# Patient Record
Sex: FEMALE | Race: WHITE | Employment: FULL TIME | URBAN - METROPOLITAN AREA
[De-identification: names, ages, dates, MRNs, and addresses within clinical notes are randomized per-mention and may not be internally consistent; named-entity substitution may affect disease eponyms.]

---

## 2017-07-07 ENCOUNTER — OFFICE VISIT (OUTPATIENT)
Dept: FAMILY MEDICINE CLINIC | Facility: CLINIC | Age: 27
End: 2017-07-07

## 2017-07-07 VITALS
RESPIRATION RATE: 14 BRPM | TEMPERATURE: 98 F | DIASTOLIC BLOOD PRESSURE: 80 MMHG | SYSTOLIC BLOOD PRESSURE: 102 MMHG | HEART RATE: 88 BPM

## 2017-07-07 DIAGNOSIS — H61.22 IMPACTED CERUMEN OF LEFT EAR: ICD-10-CM

## 2017-07-07 DIAGNOSIS — H65.02 ACUTE SEROUS OTITIS MEDIA OF LEFT EAR, RECURRENCE NOT SPECIFIED: Primary | ICD-10-CM

## 2017-07-07 PROCEDURE — 69209 REMOVE IMPACTED EAR WAX UNI: CPT | Performed by: NURSE PRACTITIONER

## 2017-07-07 PROCEDURE — 99202 OFFICE O/P NEW SF 15 MIN: CPT | Performed by: NURSE PRACTITIONER

## 2017-07-07 RX ORDER — AMOXICILLIN 875 MG/1
875 TABLET, COATED ORAL 2 TIMES DAILY
Qty: 20 TABLET | Refills: 0 | Status: SHIPPED | OUTPATIENT
Start: 2017-07-07 | End: 2017-07-17

## 2017-07-07 NOTE — PROGRESS NOTES
CHIEF COMPLAINT:   Patient presents with:  Ear Pain      HPI:   Minerva Shannon is a 28year old female who presents to clinic today with complaints of left ear pain. Has had for 10  days. Pain is described as really bad pain and pressure.   Patient repor NECK: supple, non-tender  LUNGS: clear to auscultation bilaterally, no wheezes or rhonchi. Breathing is non labored. CARDIO: RRR without murmur  EXTREMITIES: no cyanosis, clubbing or edema  LYMPH: no cervical lymphadenopathy.       ASSESSMENT AND PLAN:   L · You may use over-the-counter medicine, such as acetaminophen or ibuprofen, to control pain and fever, unless something else was prescribed.  If you have chronic liver or kidney disease or have ever had a stomach ulcer or gastrointestinal bleeding, talk wi Tiny glands in your ear make substances that combine with dead skin cells to form earwax. Earwax helps protect your ear canal from water, dirt, infection, and injury.  Over time, earwax travels from the inner part of your ear canal to the entrance of the ca · Ear drops. These help to soften the earwax. This helps it leave the ear over time. · Rinsing (irrigation) of the ear canal with water. This is done in a doctor’s office. · Removal of the earwax with small tools. This is also done in a doctor’s office. If you have a tendency to build up wax in the ear canal, you should clear the wax at home regularly, before it causes discomfort. This should be about once every six months.   · Unless a medicine was prescribed, you may use an over-the-counter product made · Swelling, redness, or tenderness of the outer ear  · Headache, neck pain, or stiff neck  Date Last Reviewed: 3/22/2015  © 6884-5140 The [address] [place] [address], [address] [place] [address]. All rights reserved.  This information is not intende

## 2017-07-07 NOTE — PATIENT INSTRUCTIONS
Middle Ear Infection (Adult)  You have an infection of the middle ear, the space behind the eardrum. This is also called acute otitis media (AOM). Sometimes it is caused by the common cold.  This is because congestion can block the internal passage (eusta Impacted earwax is a buildup of the natural wax in the ear (cerumen). Impacted earwax is very common. It can cause symptoms such as hearing loss. It can also stop a doctor doing an exam of your ear.   Understanding earwax  Tiny glands in your ear make subst Treatment for impacted earwax  If you don’t have symptoms, you may not need treatment. Often the earwax goes away on its own with time. If you have symptoms, you may have 1 or more treatments such as:  · Ear drops. These help to soften the earwax.  This hel Everyone produces earwax from the lining of the ear canal. It lubricates and protects the ear. The wax that forms in the canal slowly moves toward the outside of the ear and falls out.  Sometimes wax can build up in the ear canal. This can cause a blockage Follow up with your healthcare provider, or as advised.   When to seek medical advice  Call your healthcare provider right away if any of these occur:  · Worsening ear pain  · Fever of 100.4°F (38°C) or higher, or as directed by your healthcare provider  ·

## 2017-11-15 ENCOUNTER — OFFICE VISIT (OUTPATIENT)
Dept: INTERNAL MEDICINE CLINIC | Facility: CLINIC | Age: 27
End: 2017-11-15

## 2017-11-15 VITALS
SYSTOLIC BLOOD PRESSURE: 104 MMHG | BODY MASS INDEX: 18.33 KG/M2 | TEMPERATURE: 98 F | DIASTOLIC BLOOD PRESSURE: 60 MMHG | HEART RATE: 70 BPM | RESPIRATION RATE: 16 BRPM | HEIGHT: 65 IN | WEIGHT: 110 LBS

## 2017-11-15 DIAGNOSIS — T78.1XXA GASTROINTESTINAL FOOD SENSITIVITY: ICD-10-CM

## 2017-11-15 DIAGNOSIS — L03.011 PARONYCHIA OF FINGER OF RIGHT HAND: ICD-10-CM

## 2017-11-15 DIAGNOSIS — R12 HEARTBURN: Primary | ICD-10-CM

## 2017-11-15 PROCEDURE — 99203 OFFICE O/P NEW LOW 30 MIN: CPT | Performed by: FAMILY MEDICINE

## 2017-11-15 RX ORDER — SUCRALFATE 1 G/1
1 TABLET ORAL
Qty: 120 TABLET | Refills: 1 | Status: SHIPPED | OUTPATIENT
Start: 2017-11-15 | End: 2017-11-15

## 2017-11-15 RX ORDER — CEPHALEXIN 500 MG/1
500 CAPSULE ORAL 2 TIMES DAILY
Qty: 14 CAPSULE | Refills: 0 | Status: SHIPPED | OUTPATIENT
Start: 2017-11-15 | End: 2017-11-22

## 2017-11-15 RX ORDER — ACETAMINOPHEN AND CODEINE PHOSPHATE 300; 30 MG/1; MG/1
1 TABLET ORAL EVERY 6 HOURS PRN
Qty: 12 TABLET | Refills: 0 | Status: SHIPPED | OUTPATIENT
Start: 2017-11-15 | End: 2018-02-12

## 2017-11-15 RX ORDER — SUCRALFATE 1 G/1
1 TABLET ORAL 2 TIMES DAILY PRN
Qty: 180 TABLET | Refills: 1 | Status: SHIPPED | OUTPATIENT
Start: 2017-11-15 | End: 2017-11-16

## 2017-11-15 RX ORDER — SULFAMETHOXAZOLE AND TRIMETHOPRIM 800; 160 MG/1; MG/1
1 TABLET ORAL 2 TIMES DAILY
Qty: 7 TABLET | Refills: 0 | Status: SHIPPED | OUTPATIENT
Start: 2017-11-15 | End: 2017-11-16

## 2017-11-15 RX ORDER — ETONOGESTREL/ETHINYL ESTRADIOL .12-.015MG
RING, VAGINAL VAGINAL
Refills: 0 | COMMUNITY
Start: 2017-11-11 | End: 2018-05-16

## 2017-11-16 RX ORDER — SULFAMETHOXAZOLE AND TRIMETHOPRIM 800; 160 MG/1; MG/1
1 TABLET ORAL 2 TIMES DAILY
Qty: 14 TABLET | Refills: 0 | Status: SHIPPED | OUTPATIENT
Start: 2017-11-16 | End: 2018-02-12

## 2017-11-16 RX ORDER — SUCRALFATE 1 G/1
1 TABLET ORAL 2 TIMES DAILY PRN
Qty: 180 TABLET | Refills: 1 | Status: SHIPPED | OUTPATIENT
Start: 2017-11-16 | End: 2019-03-29

## 2017-11-16 NOTE — TELEPHONE ENCOUNTER
Received fax from [employer] for 2 meds Sucralfate and Sulfamethoxazole. Please refer to fax for questions regarding qty and sig for both meds.

## 2017-11-18 NOTE — PATIENT INSTRUCTIONS
Paronychia of the Finger or Toe  Paronychia is an infection near a fingernail or toenail. It usually occurs when an opening in the cuticle or an ingrown toenail lets bacteria under the skin. The infection will need to be drained if pus is present.  If th · Fever of 100.4ºF (38ºC) or higher, or as directed by your provider  Date Last Reviewed: 8/1/2016  © 0339-9284 The [employer]. [address] [facility], [address] [place] [address]. All rights reserved.  This information is not intended as a substitute for

## 2017-11-18 NOTE — PROGRESS NOTES
HPI:    Patient ID: Rock Sheppard is a 28year old female. HPI Here to establish care. Patient has had pain in right 3rd finger and swelling starting about one week ago. Swelling worsened despite patient soaking in warm water.  Patient c/o increasing p Acetaminophen-Codeine #3 300-30 MG Oral Tab Take 1 tablet by mouth every 6 (six) hours as needed for Pain. Disp: 12 tablet Rfl: 0   Sulfamethoxazole-TMP -160 MG Oral Tab per tablet Take 1 tablet by mouth 2 (two) times daily.  Disp: 14 tablet Rfl: 0 ALLERGY - INTERNAL       WD#[id number]

## 2018-02-13 NOTE — PATIENT INSTRUCTIONS
Impacted Earwax     Inner ear structures including ear canal and eardrum. Impacted earwax is a buildup of the natural wax in the ear (cerumen). Impacted earwax is very common. It can cause symptoms such as hearing loss.  It can also make it difficult Excess earwax usually does not cause any symptoms, unless there is a large amount of buildup.  Then it may cause symptoms such as:  · Hearing loss  · Earache  · Sense of ear fullness  · Itching in the ear  · Odor from the ear  · Ear drainage  · Dizziness  · © 7730-6057 The [employer]. [facility] [facility], [facility] [place]. All rights reserved. This information is not intended as a substitute for professional medical care. Always follow your healthcare professional's instructions.

## 2018-02-13 NOTE — PROGRESS NOTES
HPI:    Patient ID: Kiersten Cain is a 28year old female. HPI Here with c/o anxiety she has dealt with for years. Has tried many things for this including exercise, meditation, talking with therapist and nothing helps lately.  Is frustrated by this an patient restart Debrox and can return if needed for re-flush. No orders of the defined types were placed in this encounter.       Meds This Visit:  Signed Prescriptions Disp Refills    escitalopram 10 MG Oral Tab 30 tablet 0      Si/2 tab PO daily x

## 2018-03-12 RX ORDER — ESCITALOPRAM OXALATE 10 MG/1
10 TABLET ORAL DAILY
Qty: 30 TABLET | Refills: 0 | Status: SHIPPED | OUTPATIENT
Start: 2018-03-12 | End: 2018-08-28

## 2018-03-12 NOTE — TELEPHONE ENCOUNTER
LFV:2/12/2018 with AMS  Future Appt: none on file  Last Rx:2/12/2018 for 30 tablets  Last Labs:none   Per protocol to provider

## 2018-04-05 NOTE — PROGRESS NOTES
HPI:    Patient ID: Taye Leahy is a 33year old female. HPI Here for f/u on Lexapro start. Patient has been taking for about 5 weeks and feels that this has helped her anxiety somewhat.  Notes since starting however, has had intermittent menstrual s were placed in this encounter. Meds This Visit:  Signed Prescriptions Disp Refills    escitalopram 20 MG Oral Tab 30 tablet 2      Sig: Take 1 tablet (20 mg total) by mouth daily.            Imaging & Referrals:  None       #[id number]

## 2018-05-16 ENCOUNTER — OFFICE VISIT (OUTPATIENT)
Dept: INTERNAL MEDICINE CLINIC | Facility: CLINIC | Age: 28
End: 2018-05-16

## 2018-05-16 VITALS
TEMPERATURE: 98 F | BODY MASS INDEX: 17.77 KG/M2 | RESPIRATION RATE: 15 BRPM | HEIGHT: 65 IN | WEIGHT: 106.63 LBS | HEART RATE: 83 BPM | SYSTOLIC BLOOD PRESSURE: 110 MMHG | DIASTOLIC BLOOD PRESSURE: 60 MMHG

## 2018-05-16 DIAGNOSIS — Z00.00 ANNUAL PHYSICAL EXAM: Primary | ICD-10-CM

## 2018-05-16 DIAGNOSIS — H91.92 HEARING LOSS OF LEFT EAR, UNSPECIFIED HEARING LOSS TYPE: ICD-10-CM

## 2018-05-16 DIAGNOSIS — Z12.4 SCREENING FOR CERVICAL CANCER: ICD-10-CM

## 2018-05-16 DIAGNOSIS — H92.02 LEFT EAR PAIN: ICD-10-CM

## 2018-05-16 DIAGNOSIS — F41.1 GAD (GENERALIZED ANXIETY DISORDER): ICD-10-CM

## 2018-05-16 PROCEDURE — 90471 IMMUNIZATION ADMIN: CPT | Performed by: FAMILY MEDICINE

## 2018-05-16 PROCEDURE — 88175 CYTOPATH C/V AUTO FLUID REDO: CPT | Performed by: FAMILY MEDICINE

## 2018-05-16 PROCEDURE — 99213 OFFICE O/P EST LOW 20 MIN: CPT | Performed by: FAMILY MEDICINE

## 2018-05-16 PROCEDURE — 90715 TDAP VACCINE 7 YRS/> IM: CPT | Performed by: FAMILY MEDICINE

## 2018-05-16 PROCEDURE — 99395 PREV VISIT EST AGE 18-39: CPT | Performed by: FAMILY MEDICINE

## 2018-05-16 RX ORDER — ETONOGESTREL/ETHINYL ESTRADIOL .12-.015MG
RING, VAGINAL VAGINAL
Qty: 3 EACH | Refills: 3 | Status: SHIPPED | OUTPATIENT
Start: 2018-05-16 | End: 2019-05-22 | Stop reason: ALTCHOICE

## 2018-05-18 NOTE — PROGRESS NOTES
HPI:    Patient ID: Janel Salas is a 33year old female. HPI Here for annual check-up. Patient is eating healthy and exercising regularly. Unsure of last tetanus booster but thinks it has been more than 10 years. Due for pap smear.  Pap smears have b frequency. Musculoskeletal: Negative for arthralgias and joint swelling. Skin: Negative for rash. Neurological: Negative for dizziness, weakness, numbness and headaches. Hematological: Negative for adenopathy. Does not bruise/bleed easily.    Psychi Neurological: She is alert and oriented to person, place, and time. Skin: Skin is warm and dry. Psychiatric: She has a normal mood and affect. Her behavior is normal.   Vitals reviewed.              ASSESSMENT/PLAN:   Annual physical exam  (primary en

## 2018-08-13 ENCOUNTER — TELEPHONE (OUTPATIENT)
Dept: INTERNAL MEDICINE CLINIC | Facility: CLINIC | Age: 28
End: 2018-08-13

## 2018-08-13 NOTE — TELEPHONE ENCOUNTER
Pt called wanting appt for ear pain/discuss changing dose of anxiety meds-offered her 8/14 at 1:15 ok per AMS and pt stated she works until 4-said she discussed going to therapist before w/AMS and would maybe think about doing that and would call us back i

## 2018-08-28 ENCOUNTER — TELEPHONE (OUTPATIENT)
Dept: INTERNAL MEDICINE CLINIC | Facility: CLINIC | Age: 28
End: 2018-08-28

## 2018-08-28 NOTE — TELEPHONE ENCOUNTER
Per AMS, pt needs sooner ENT appt d/t L ear pain/intermittent hearing loss. Spoke with David Michaud and scheduled OV on 9/4 at 11:40am with Dr. Julia Delgado in [facility]. Pt agreed to date/time and verbalized understanding.   David Michaud asked us to also cancel pt's OV on

## 2018-08-28 NOTE — PROGRESS NOTES
HPI:    Patient ID: Minerva Shannon is a 33year old female. HPI Here for f/u on Sertraline start. Patient took for about one month. Didn't like how it made her feel- felt nauseated.  Didn't feel like it helped for anxiety or depression so stopped this m weeks, sooner if issues with med. [facility] Navigator order placed again to try different Psychologist.   2. Made appt for patient for ENT 9/4. No orders of the defined types were placed in this encounter.       Meds This Visit:  Signed Prescriptions Disp Refill

## 2018-09-28 NOTE — TELEPHONE ENCOUNTER
Please call patient. We received a refill request for Desvenlafaxine 25mg- she was suppose to f/u in office to discuss this med since it was a new start. Does she want to make appt or does she like this med at this dose?  I can refill it without seeing her

## 2018-09-28 NOTE — TELEPHONE ENCOUNTER
E request  Medication(s) to Refill:   Requested Prescriptions     Pending Prescriptions Disp Refills   • DESVENLAFAXINE SUCCINATE ER 25 MG Oral Tablet 24 Hr [Pharmacy Med Name: DESVENLAFAXINE ER SUCCINATE 25MG T] 49 tablet 0     Sig: TAKE 1 TABLET BY MOUTH

## 2018-10-01 NOTE — TELEPHONE ENCOUNTER
Called pt, LMTCB to confirm if she wants to continue this medication or not - or if she wants to schedule OV for further discussion.

## 2018-10-02 NOTE — TELEPHONE ENCOUNTER
Pt called back and stated that this medication is working for her and she would like to stay on it so we can go ahead and refill    Please advise

## 2018-10-03 RX ORDER — DESVENLAFAXINE 25 MG/1
50 TABLET, EXTENDED RELEASE ORAL DAILY
Qty: 180 TABLET | Refills: 1 | Status: SHIPPED | OUTPATIENT
Start: 2018-10-03 | End: 2019-05-22 | Stop reason: ALTCHOICE

## 2018-10-26 ENCOUNTER — HOSPITAL ENCOUNTER (OUTPATIENT)
Dept: CT IMAGING | Facility: HOSPITAL | Age: 28
Discharge: HOME | End: 2018-10-26
Attending: OTOLARYNGOLOGY
Payer: COMMERCIAL

## 2018-10-26 DIAGNOSIS — H91.90 HEARING PROBLEM, UNSPECIFIED LATERALITY: ICD-10-CM

## 2018-10-26 PROCEDURE — 70480 CT ORBIT/EAR/FOSSA W/O DYE: CPT | Performed by: OTOLARYNGOLOGY

## 2019-03-13 RX ORDER — SUCRALFATE 1 G/1
TABLET ORAL
Qty: 180 TABLET | Refills: 0 | Status: SHIPPED | OUTPATIENT
Start: 2019-03-13 | End: 2021-06-18

## 2019-03-29 ENCOUNTER — OFFICE VISIT (OUTPATIENT)
Dept: INTERNAL MEDICINE CLINIC | Facility: CLINIC | Age: 29
End: 2019-03-29

## 2019-03-29 VITALS
RESPIRATION RATE: 20 BRPM | HEIGHT: 65 IN | BODY MASS INDEX: 17.33 KG/M2 | TEMPERATURE: 98 F | SYSTOLIC BLOOD PRESSURE: 98 MMHG | DIASTOLIC BLOOD PRESSURE: 60 MMHG | WEIGHT: 104 LBS | HEART RATE: 106 BPM

## 2019-03-29 DIAGNOSIS — F32.A ANXIETY AND DEPRESSION: ICD-10-CM

## 2019-03-29 DIAGNOSIS — R21 RASH: ICD-10-CM

## 2019-03-29 DIAGNOSIS — F41.9 ANXIETY AND DEPRESSION: ICD-10-CM

## 2019-03-29 DIAGNOSIS — Z30.09 ENCOUNTER FOR COUNSELING REGARDING CONTRACEPTION: Primary | ICD-10-CM

## 2019-03-29 PROCEDURE — 99214 OFFICE O/P EST MOD 30 MIN: CPT | Performed by: FAMILY MEDICINE

## 2019-03-29 RX ORDER — ERYTHROMYCIN 20 MG/G
1 GEL TOPICAL DAILY
Qty: 1 TUBE | Refills: 0 | Status: SHIPPED | OUTPATIENT
Start: 2019-03-29 | End: 2019-12-02

## 2019-03-29 RX ORDER — ESCITALOPRAM OXALATE 10 MG/1
10 TABLET ORAL DAILY
Qty: 90 TABLET | Refills: 0 | Status: SHIPPED | OUTPATIENT
Start: 2019-03-29 | End: 2019-12-02

## 2019-03-31 NOTE — PROGRESS NOTES
HPI:    Patient ID: Janel Salas is a 26year old female. HPI Here with rash that started on her face about 2 weeks ago. Comes and goes. Thought this was a reaction to her eating a lot of yogurt. No new products. Has had increased anxiety.  Taking P risks/benefits/potential side effects and proper use of medication. 3. Reviewed previous meds patient has been on and will try Lexapro again. Discussed risks/benefits/potential side effects and proper use of medication.      No orders of the defined types

## 2019-05-22 ENCOUNTER — OFFICE VISIT (OUTPATIENT)
Dept: OBGYN CLINIC | Facility: CLINIC | Age: 29
End: 2019-05-22

## 2019-05-22 VITALS
BODY MASS INDEX: 17.13 KG/M2 | SYSTOLIC BLOOD PRESSURE: 92 MMHG | HEIGHT: 65 IN | DIASTOLIC BLOOD PRESSURE: 60 MMHG | WEIGHT: 102.81 LBS

## 2019-05-22 DIAGNOSIS — Z01.419 WELL WOMAN EXAM WITH ROUTINE GYNECOLOGICAL EXAM: Primary | ICD-10-CM

## 2019-05-22 DIAGNOSIS — Z30.09 ENCOUNTER FOR COUNSELING REGARDING CONTRACEPTION: ICD-10-CM

## 2019-05-22 PROCEDURE — 99385 PREV VISIT NEW AGE 18-39: CPT | Performed by: OBSTETRICS & GYNECOLOGY

## 2019-05-22 NOTE — PATIENT INSTRUCTIONS
Please return in one year for your annual gyne visit or sooner if having abnormal vaginal bleeding or severe pelvic pain    ------------------------------------------------  If you desire the intrauterine device:     Please make an office visit for intraut

## 2019-05-22 NOTE — PROGRESS NOTES
[facility]  Obstetrics and Gynecology  History & Physical    CC: Patient is a new patient and here for a well woman exam     Subjective:     HPI: Maranda Daly is a 26year old [place] female here for a well women exam. Patient reports she woul name: Not on file      Number of children: Not on file      Years of education: Not on file      Highest education level: Not on file    Occupational History      Not on file    Social Needs      Financial resource strain: Not on file      Food insecurity: skin changes, pain, lumps or discharge   Respiratory:  denies SOB, dyspnea, cough or wheezing  Cardiovascular:  denies chest pain, palpitations  GI: denies abdominal pain, diarrhea, constipation  :  denies dysuria, hematuria, increased urinary frequency. irregular bleeding, infection, injury and pregnancy   - advised patient to consider options, information provided  - advised to contact office if she desires IUD placement  - d/w patient optimal placement plan including cytotec therapy, placement during me

## 2019-06-03 RX ORDER — ETONOGESTREL/ETHINYL ESTRADIOL .12-.015MG
RING, VAGINAL VAGINAL
Qty: 3 EACH | Refills: 2 | Status: SHIPPED | OUTPATIENT
Start: 2019-06-03 | End: 2019-12-02

## 2019-06-03 NOTE — TELEPHONE ENCOUNTER
LOV: 3/29/19  Future Visit: None  Last Rx: 5/16/18 3 ea 3 refills  Last Labs: pap 5/16/18  Per protocol per provider

## 2019-12-02 ENCOUNTER — OFFICE VISIT (OUTPATIENT)
Dept: OBGYN CLINIC | Facility: CLINIC | Age: 29
End: 2019-12-02
Payer: COMMERCIAL

## 2019-12-02 VITALS
WEIGHT: 103 LBS | BODY MASS INDEX: 17.16 KG/M2 | SYSTOLIC BLOOD PRESSURE: 100 MMHG | DIASTOLIC BLOOD PRESSURE: 60 MMHG | HEIGHT: 65 IN

## 2019-12-02 DIAGNOSIS — Z30.430 ENCOUNTER FOR INSERTION OF INTRAUTERINE CONTRACEPTIVE DEVICE: ICD-10-CM

## 2019-12-02 DIAGNOSIS — Z01.812 PRE-PROCEDURAL LABORATORY EXAMINATION: Primary | ICD-10-CM

## 2019-12-02 PROCEDURE — 58300 INSERT INTRAUTERINE DEVICE: CPT | Performed by: OBSTETRICS & GYNECOLOGY

## 2019-12-02 PROCEDURE — 81025 URINE PREGNANCY TEST: CPT | Performed by: OBSTETRICS & GYNECOLOGY

## 2019-12-02 NOTE — PROCEDURES
Procedure: Intrauterine device insertion - Mirena    Date of Procedure: 12/02/19    Pre-procedure diagnosis:  Encounter for contraception     Post-procedure diagnosis:   Encounter for contraction     Indications:   26year old female [place] who presents f Jason Coughlin MD   [facility] - OBGYN

## 2020-01-06 ENCOUNTER — OFFICE VISIT (OUTPATIENT)
Dept: OBGYN CLINIC | Facility: CLINIC | Age: 30
End: 2020-01-06
Payer: COMMERCIAL

## 2020-01-06 VITALS
SYSTOLIC BLOOD PRESSURE: 112 MMHG | DIASTOLIC BLOOD PRESSURE: 68 MMHG | BODY MASS INDEX: 17.33 KG/M2 | WEIGHT: 104 LBS | HEART RATE: 85 BPM | HEIGHT: 65 IN

## 2020-01-06 DIAGNOSIS — Z97.5 IUD (INTRAUTERINE DEVICE) IN PLACE: Primary | ICD-10-CM

## 2020-01-06 PROCEDURE — 99213 OFFICE O/P EST LOW 20 MIN: CPT | Performed by: OBSTETRICS & GYNECOLOGY

## 2020-01-06 NOTE — PROGRESS NOTES
[facility]  Obstetrics and Gynecology  Follow Up Progress Note    Subjective:     Greg Workman is a 26[place] who was last seen in office on 12/02/19 for Mirena IUD insertion.  The patient was recommended to return for follow u findings and plan were discussed with the patient. She notes understanding and agrees with the plan of care. All questions were answered to the best of my ability at this time.     RTC in 1 year or sooner if needed     Pao Roland MD   [facility] - OBGYN

## 2021-06-18 DIAGNOSIS — Z13.1 SCREENING FOR DIABETES MELLITUS: ICD-10-CM

## 2021-06-18 DIAGNOSIS — Z13.0 SCREENING FOR DEFICIENCY ANEMIA: Primary | ICD-10-CM

## 2021-06-18 DIAGNOSIS — Z13.220 SCREENING, LIPID: ICD-10-CM

## 2021-06-18 NOTE — TELEPHONE ENCOUNTER
Last OV 3.29.19 w/ AMS (rash)   Last PE No recent PE within last 2 years  Last REFILL 3.13.19 Sucralfate 1g #180 0R  Last LABS No recent labs within last 12 months     No future appointments. Per PROTOCOL?  FAILED-no appt in last 6 months or next 3 mon

## 2021-06-19 RX ORDER — SUCRALFATE 1 G/1
1 TABLET ORAL 2 TIMES DAILY PRN
Qty: 60 TABLET | Refills: 0 | Status: SHIPPED | OUTPATIENT
Start: 2021-06-19 | End: 2021-07-07

## 2021-06-19 NOTE — TELEPHONE ENCOUNTER
Please call patient to schedule annual check-up. She has not been seen for 1.5 years. Fasting lab orders are at THE [facility].

## 2021-06-21 NOTE — TELEPHONE ENCOUNTER
appt scheduled  Future Appointments   Date Time Provider Deanne Bernardo   7/7/2021  5:00 PM Gasper Hodges DO [facility]

## 2021-06-26 ENCOUNTER — LAB ENCOUNTER (OUTPATIENT)
Dept: LAB | Age: 31
End: 2021-06-26
Attending: FAMILY MEDICINE
Payer: COMMERCIAL

## 2021-06-26 DIAGNOSIS — Z13.1 SCREENING FOR DIABETES MELLITUS: ICD-10-CM

## 2021-06-26 DIAGNOSIS — Z13.0 SCREENING FOR DEFICIENCY ANEMIA: ICD-10-CM

## 2021-06-26 DIAGNOSIS — Z13.220 SCREENING, LIPID: ICD-10-CM

## 2021-06-26 PROCEDURE — 36415 COLL VENOUS BLD VENIPUNCTURE: CPT

## 2021-06-26 PROCEDURE — 85025 COMPLETE CBC W/AUTO DIFF WBC: CPT

## 2021-06-26 PROCEDURE — 80053 COMPREHEN METABOLIC PANEL: CPT

## 2021-06-26 PROCEDURE — 80061 LIPID PANEL: CPT

## 2021-07-07 ENCOUNTER — OFFICE VISIT (OUTPATIENT)
Dept: INTERNAL MEDICINE CLINIC | Facility: CLINIC | Age: 31
End: 2021-07-07
Payer: COMMERCIAL

## 2021-07-07 VITALS
SYSTOLIC BLOOD PRESSURE: 110 MMHG | WEIGHT: 99 LBS | OXYGEN SATURATION: 98 % | HEART RATE: 81 BPM | TEMPERATURE: 98 F | DIASTOLIC BLOOD PRESSURE: 60 MMHG | BODY MASS INDEX: 16.5 KG/M2 | HEIGHT: 65 IN

## 2021-07-07 DIAGNOSIS — H83.8X9 SUPERIOR SEMICIRCULAR CANAL DEHISCENCE, UNSPECIFIED LATERALITY: ICD-10-CM

## 2021-07-07 DIAGNOSIS — F41.9 ANXIETY: ICD-10-CM

## 2021-07-07 DIAGNOSIS — R53.83 FATIGUE, UNSPECIFIED TYPE: ICD-10-CM

## 2021-07-07 DIAGNOSIS — Z00.00 ANNUAL PHYSICAL EXAM: Primary | ICD-10-CM

## 2021-07-07 PROCEDURE — 99213 OFFICE O/P EST LOW 20 MIN: CPT | Performed by: FAMILY MEDICINE

## 2021-07-07 PROCEDURE — 3078F DIAST BP <80 MM HG: CPT | Performed by: FAMILY MEDICINE

## 2021-07-07 PROCEDURE — 3074F SYST BP LT 130 MM HG: CPT | Performed by: FAMILY MEDICINE

## 2021-07-07 PROCEDURE — 99395 PREV VISIT EST AGE 18-39: CPT | Performed by: FAMILY MEDICINE

## 2021-07-07 PROCEDURE — 3008F BODY MASS INDEX DOCD: CPT | Performed by: FAMILY MEDICINE

## 2021-07-07 RX ORDER — ESCITALOPRAM OXALATE 10 MG/1
TABLET ORAL
Qty: 90 TABLET | Refills: 3 | Status: SHIPPED | OUTPATIENT
Start: 2021-07-07 | End: 2021-09-23

## 2021-07-07 RX ORDER — SUCRALFATE 1 G/1
1 TABLET ORAL 2 TIMES DAILY PRN
Qty: 180 TABLET | Refills: 3 | Status: SHIPPED | OUTPATIENT
Start: 2021-07-07 | End: 2021-09-14

## 2021-07-07 RX ORDER — ESCITALOPRAM OXALATE 10 MG/1
TABLET ORAL
Qty: 90 TABLET | Refills: 0 | Status: SHIPPED | OUTPATIENT
Start: 2021-07-07 | End: 2021-07-07

## 2021-07-07 NOTE — PATIENT INSTRUCTIONS
Get your blood work done again- you do not have to fast for this one. Schedule an appointment with Dr. Emma Berrios. Let me know in 4 weeks if your fatigue has improved.      Prevention Guidelines, Women Ages 25 to 44  Screening tests and vaccines are an i years.  Also, testing for diabetes during pregnancy after the 24th week.     Gonorrhea Sexually active women at increased risk for infection  At routine exams   Hepatitis C Anyone at increased risk  At routine exams   HIV All women At routine exams3     Obe (PCV13) and pneumococcal polysaccharide vaccine (PPSV23)  Women at increased risk for infection should talk with their healthcare provider  PCV13: 1 dose ages 23 to 72 (protects against 13 types of pneumococcal bacteria)   PPSV23: 1 to 2 doses through age of routine health care. Tasneem last reviewed this educational content on 10/1/2017  © 0667-9314 The [facility]. All rights reserved. This information is not intended as a substitute for professional medical care.  Always follow your healthcar

## 2021-07-08 NOTE — PROGRESS NOTES
HPI/Subjective:   Patient ID: Galina Brewer is a 27year old female. HPI Here for annual check-up. Patient has Gyne and plans to schedule pap with them. Uses sucralfate about once per day and this helps.    Was taking lexapro 10mg and stopped a littl and polyuria. Genitourinary: Negative for dysuria and frequency. Musculoskeletal: Negative for arthralgias and joint swelling. Skin: Negative for rash. Neurological: Negative for dizziness, weakness, numbness and headaches.    Hematological: Negativ preventive health and safety recommendations with patient. Reviewed lab results. Encouraged regular exercise and healthy eating. 2. Discussed possible causes. Check labs. Will see if restart of Lexapro helps.  Discussed also possible eval for snoring/larg

## 2021-07-24 ENCOUNTER — LAB ENCOUNTER (OUTPATIENT)
Dept: LAB | Facility: HOSPITAL | Age: 31
End: 2021-07-24
Attending: FAMILY MEDICINE
Payer: COMMERCIAL

## 2021-07-24 DIAGNOSIS — R53.83 FATIGUE, UNSPECIFIED TYPE: ICD-10-CM

## 2021-07-24 LAB
TSI SER-ACNC: 1.24 MIU/ML (ref 0.36–3.74)
VIT B12 SERPL-MCNC: 693 PG/ML (ref 193–986)
VIT D+METAB SERPL-MCNC: 47.1 NG/ML (ref 30–100)

## 2021-07-24 PROCEDURE — 84443 ASSAY THYROID STIM HORMONE: CPT

## 2021-07-24 PROCEDURE — 36415 COLL VENOUS BLD VENIPUNCTURE: CPT

## 2021-07-24 PROCEDURE — 82306 VITAMIN D 25 HYDROXY: CPT

## 2021-07-24 PROCEDURE — 82607 VITAMIN B-12: CPT

## 2021-09-14 RX ORDER — SUCRALFATE 1 G/1
TABLET ORAL
Qty: 180 TABLET | Refills: 3 | Status: SHIPPED | OUTPATIENT
Start: 2021-09-14 | End: 2021-12-08

## 2021-09-14 NOTE — TELEPHONE ENCOUNTER
Been Following AMS  Last OV 7/7/21  Last CPE 7/7/21  Last Labs TSH w Ref, Vit D, Vit B12 7/24/21    Last Rx fill Sucralfate 1g #180 3R 7/7/21    Future Appointments   Date Time Provider Deanne Bernardo   9/23/2021  3:00 PM Lianet Rodriguez MD [facility]

## 2021-09-23 ENCOUNTER — OFFICE VISIT (OUTPATIENT)
Dept: OBGYN CLINIC | Facility: CLINIC | Age: 31
End: 2021-09-23
Payer: COMMERCIAL

## 2021-09-23 VITALS
BODY MASS INDEX: 16.33 KG/M2 | HEIGHT: 65 IN | DIASTOLIC BLOOD PRESSURE: 64 MMHG | SYSTOLIC BLOOD PRESSURE: 102 MMHG | WEIGHT: 98 LBS

## 2021-09-23 DIAGNOSIS — Z30.432 ENCOUNTER FOR REMOVAL OF INTRAUTERINE CONTRACEPTIVE DEVICE: ICD-10-CM

## 2021-09-23 DIAGNOSIS — Z12.4 ENCOUNTER FOR SCREENING FOR CERVICAL CANCER: ICD-10-CM

## 2021-09-23 DIAGNOSIS — Z01.419 WELL WOMAN EXAM WITH ROUTINE GYNECOLOGICAL EXAM: Primary | ICD-10-CM

## 2021-09-23 PROBLEM — Z97.5 IUD (INTRAUTERINE DEVICE) IN PLACE: Status: RESOLVED | Noted: 2020-01-06 | Resolved: 2021-09-23

## 2021-09-23 PROCEDURE — 3078F DIAST BP <80 MM HG: CPT | Performed by: OBSTETRICS & GYNECOLOGY

## 2021-09-23 PROCEDURE — 3008F BODY MASS INDEX DOCD: CPT | Performed by: OBSTETRICS & GYNECOLOGY

## 2021-09-23 PROCEDURE — 87624 HPV HI-RISK TYP POOLED RSLT: CPT | Performed by: OBSTETRICS & GYNECOLOGY

## 2021-09-23 PROCEDURE — 58301 REMOVE INTRAUTERINE DEVICE: CPT | Performed by: OBSTETRICS & GYNECOLOGY

## 2021-09-23 PROCEDURE — 3074F SYST BP LT 130 MM HG: CPT | Performed by: OBSTETRICS & GYNECOLOGY

## 2021-09-23 PROCEDURE — 99395 PREV VISIT EST AGE 18-39: CPT | Performed by: OBSTETRICS & GYNECOLOGY

## 2021-09-23 NOTE — PROCEDURES
Procedure Note: IUD removal     Preoperative Diagnosis:  IUD in place     Postoperative Diagnosis:  S/p IUD removal     Indications:  27year old y/o [place] female with Mirena IUD in placed since 12/2019 who presents for IUD removal per patient request.

## 2021-09-23 NOTE — PROGRESS NOTES
[facility]  Obstetrics and Gynecology  History & Physical    CC: Patient presents for a well woman exam     Subjective:     HPI: Tyrell Terrazas is a 27year old [place] female here for a well women exam. Patient reports recently  and woul Use      Smoking status: Never Smoker      Smokeless tobacco: Never Used    Vaping Use      Vaping Use: Never used    Substance and Sexual Activity      Alcohol use: No      Drug use: No      Sexual activity: Yes        Partners: Male        Birth control/ in the Last Year: Not on file      Number of Places Lived in the Last Year: Not on file      Unstable Housing in the Last Year: Not on file      Patient feels unsafe or threatened?: denies    Abuse: denies physical, sexual or mental.     Family History:  F place        Plan:     Cervical cancer screening  - discussion held with the patient about ASCCP guidelines  - repeat pap smear today   Health maintenance  - encouraged to maintain weight at healthy BMI  - discussed importance of exercise and healthy eatin

## 2021-09-29 LAB — HPV I/H RISK 1 DNA SPEC QL NAA+PROBE: NEGATIVE

## 2021-09-30 LAB
LAST PAP RESULT: NORMAL
PAP HISTORY (OTHER THAN LAST PAP): NORMAL

## 2021-10-12 ENCOUNTER — TELEPHONE (OUTPATIENT)
Dept: INTERNAL MEDICINE CLINIC | Facility: CLINIC | Age: 31
End: 2021-10-12

## 2021-10-12 NOTE — TELEPHONE ENCOUNTER
Received and abstracted covid test results from [address] [place] [address] collected on 10/09/2021. Placed in AMS bin to review.

## 2021-11-08 ENCOUNTER — TELEPHONE (OUTPATIENT)
Dept: OBGYN CLINIC | Facility: CLINIC | Age: 31
End: 2021-11-08

## 2021-11-08 DIAGNOSIS — N91.2 AMENORRHEA: Primary | ICD-10-CM

## 2021-11-08 NOTE — TELEPHONE ENCOUNTER
Patient called stating she had 5 positive at home pregnancy tests. She doesn't know when her last menstrual is because she has not had one since her IUD has been removed.  Please advise

## 2021-11-08 NOTE — TELEPHONE ENCOUNTER
Established patient.    hcg ordered    LMP: unk  EDC based on lmp: unk    8 wks on unk        Are cycles regular?: has not had menses since IUD removed in 2021    Medical problems: anxiety    Past sx hx: back surgery, wisdom teeth removal

## 2021-11-11 ENCOUNTER — LAB ENCOUNTER (OUTPATIENT)
Dept: LAB | Age: 31
End: 2021-11-11
Attending: OBSTETRICS & GYNECOLOGY
Payer: COMMERCIAL

## 2021-11-11 ENCOUNTER — TELEPHONE (OUTPATIENT)
Dept: INTERNAL MEDICINE CLINIC | Facility: CLINIC | Age: 31
End: 2021-11-11

## 2021-11-11 DIAGNOSIS — N91.2 AMENORRHEA: ICD-10-CM

## 2021-11-11 PROCEDURE — 84702 CHORIONIC GONADOTROPIN TEST: CPT | Performed by: OBSTETRICS & GYNECOLOGY

## 2021-11-15 ENCOUNTER — TELEPHONE (OUTPATIENT)
Dept: OBGYN CLINIC | Facility: CLINIC | Age: 31
End: 2021-11-15

## 2021-11-15 NOTE — TELEPHONE ENCOUNTER
Patient called awaiting results on hcg was told will be called first thing Monday morning     Thank you

## 2021-11-15 NOTE — TELEPHONE ENCOUNTER
Per Dr. Lonell Claude, 1-2 weeks should be fine. Left message on VM per RICHAR segundo. To call back to schedule  in about 2 wks.

## 2021-11-15 NOTE — TELEPHONE ENCOUNTER
hcg 3,059.0   Lab work done due to irregular cycle. Needs to have  scheduled. Routed to provider for review and advice on when to schedule .

## 2021-12-08 PROBLEM — H71.02 CHOLESTEATOMA OF ATTIC OF EAR, LEFT: Status: ACTIVE | Noted: 2021-12-08

## 2022-03-19 ENCOUNTER — HOSPITAL ENCOUNTER (OUTPATIENT)
Facility: HOSPITAL | Age: 32
Discharge: HOME | End: 2022-03-19
Attending: OBSTETRICS & GYNECOLOGY | Admitting: OBSTETRICS & GYNECOLOGY
Payer: COMMERCIAL

## 2022-03-19 ENCOUNTER — TELEPHONE (OUTPATIENT)
Dept: OBGYN UNIT | Facility: HOSPITAL | Age: 32
End: 2022-03-19

## 2022-03-19 ENCOUNTER — APPOINTMENT (OUTPATIENT)
Dept: ULTRASOUND IMAGING | Facility: HOSPITAL | Age: 32
End: 2022-03-19
Attending: OBSTETRICS & GYNECOLOGY
Payer: COMMERCIAL

## 2022-03-19 VITALS
WEIGHT: 117 LBS | BODY MASS INDEX: 19.49 KG/M2 | DIASTOLIC BLOOD PRESSURE: 74 MMHG | SYSTOLIC BLOOD PRESSURE: 127 MMHG | HEART RATE: 92 BPM | RESPIRATION RATE: 18 BRPM | TEMPERATURE: 98 F | HEIGHT: 65 IN

## 2022-03-19 LAB
ANTIBODY SCREEN: NEGATIVE
BASOPHILS # BLD AUTO: 0.03 X10(3) UL (ref 0–0.2)
BASOPHILS NFR BLD AUTO: 0.2 %
EOSINOPHIL # BLD AUTO: 0.05 X10(3) UL (ref 0–0.7)
EOSINOPHIL NFR BLD AUTO: 0.4 %
ERYTHROCYTE [DISTWIDTH] IN BLOOD BY AUTOMATED COUNT: 12.9 %
HCT VFR BLD AUTO: 36.2 %
HGB BLD-MCNC: 12.3 G/DL
IMM GRANULOCYTES # BLD AUTO: 0.11 X10(3) UL (ref 0–1)
IMM GRANULOCYTES NFR BLD: 0.8 %
KLEIHAUER BETKE RESULT: NEGATIVE
LYMPHOCYTES # BLD AUTO: 2.06 X10(3) UL (ref 1–4)
LYMPHOCYTES NFR BLD AUTO: 15.5 %
MCH RBC QN AUTO: 31.4 PG (ref 26–34)
MCHC RBC AUTO-ENTMCNC: 34 G/DL (ref 31–37)
MCV RBC AUTO: 92.3 FL
MONOCYTES # BLD AUTO: 0.76 X10(3) UL (ref 0.1–1)
MONOCYTES NFR BLD AUTO: 5.7 %
NEUTROPHILS # BLD AUTO: 10.31 X10 (3) UL (ref 1.5–7.7)
NEUTROPHILS # BLD AUTO: 10.31 X10(3) UL (ref 1.5–7.7)
NEUTROPHILS NFR BLD AUTO: 77.4 %
PLATELET # BLD AUTO: 213 10(3)UL (ref 150–450)
RBC # BLD AUTO: 3.92 X10(6)UL
RH BLOOD TYPE: NEGATIVE
WBC # BLD AUTO: 13.3 X10(3) UL (ref 4–11)

## 2022-03-19 PROCEDURE — 85460 HEMOGLOBIN FETAL: CPT | Performed by: OBSTETRICS & GYNECOLOGY

## 2022-03-19 PROCEDURE — 86900 BLOOD TYPING SEROLOGIC ABO: CPT | Performed by: OBSTETRICS & GYNECOLOGY

## 2022-03-19 PROCEDURE — 36415 COLL VENOUS BLD VENIPUNCTURE: CPT

## 2022-03-19 PROCEDURE — 86850 RBC ANTIBODY SCREEN: CPT | Performed by: OBSTETRICS & GYNECOLOGY

## 2022-03-19 PROCEDURE — 85025 COMPLETE CBC W/AUTO DIFF WBC: CPT | Performed by: OBSTETRICS & GYNECOLOGY

## 2022-03-19 PROCEDURE — 76815 OB US LIMITED FETUS(S): CPT | Performed by: OBSTETRICS & GYNECOLOGY

## 2022-03-19 PROCEDURE — 99214 OFFICE O/P EST MOD 30 MIN: CPT

## 2022-03-19 PROCEDURE — 86901 BLOOD TYPING SEROLOGIC RH(D): CPT | Performed by: OBSTETRICS & GYNECOLOGY

## 2022-03-19 NOTE — PROGRESS NOTES
Pt continues to report tenderness where she bumped her abd (not getting worse since admission), denies vag bleeding, leaking of fluid, and uterine cramping/ctx, states + fetal movement.

## 2022-03-19 NOTE — PROGRESS NOTES
Discharge instructions reviewed with pt & spouse, all questions answered and they verbalize understanding. Pt discharged from triage in stable condition, not in active labor.

## 2022-03-19 NOTE — PROGRESS NOTES
Pt , edc 22 (23 3/7wks) admitted to triage rm 3 from home. Pt states she was walking in her house and tripped on a baby gate at 21 , she caught herself from falling but bumped her abd on either the gate or the corner of the wall. Pt states she is tender in her abd on her right side running vertically where she hit her abd. Pt states + fetal movement, denies uterine cramping, vag bleeding or leaking of fluid. efm tested, explained & applied. Orders received prior to pt's admission into triage from dr Samreen Hutchinson. Pt assessment completed. Pt & spouse oriented to rm/call light/poc.

## 2022-05-05 LAB
HIV RESULT OB: NEGATIVE
HIV RESULT OB: NEGATIVE

## 2022-06-15 LAB
STREP GP B CULT OB: NEGATIVE
STREP GP B CULT OB: NEGATIVE

## 2022-07-13 ENCOUNTER — HOSPITAL ENCOUNTER (INPATIENT)
Facility: HOSPITAL | Age: 32
LOS: 2 days | Discharge: HOME | End: 2022-07-15
Attending: OBSTETRICS & GYNECOLOGY | Admitting: OBSTETRICS & GYNECOLOGY
Payer: COMMERCIAL

## 2022-07-13 PROBLEM — Z34.90 PREGNANCY (HCC): Status: ACTIVE | Noted: 2022-07-13

## 2022-07-13 PROBLEM — Z34.90 PREGNANCY: Status: ACTIVE | Noted: 2022-07-13

## 2022-07-13 LAB
ANTIBODY SCREEN: POSITIVE
BASOPHILS # BLD AUTO: 0.03 X10(3) UL (ref 0–0.2)
BASOPHILS NFR BLD AUTO: 0.2 %
EOSINOPHIL # BLD AUTO: 0.05 X10(3) UL (ref 0–0.7)
EOSINOPHIL NFR BLD AUTO: 0.4 %
ERYTHROCYTE [DISTWIDTH] IN BLOOD BY AUTOMATED COUNT: 13 %
FETAL SCREEN RESULT: NEGATIVE
HCT VFR BLD AUTO: 42.8 %
HGB BLD-MCNC: 14.4 G/DL
IMM GRANULOCYTES # BLD AUTO: 0.1 X10(3) UL (ref 0–1)
IMM GRANULOCYTES NFR BLD: 0.8 %
LYMPHOCYTES # BLD AUTO: 1.67 X10(3) UL (ref 1–4)
LYMPHOCYTES NFR BLD AUTO: 13.1 %
MCH RBC QN AUTO: 30.9 PG (ref 26–34)
MCHC RBC AUTO-ENTMCNC: 33.6 G/DL (ref 31–37)
MCV RBC AUTO: 91.8 FL
MONOCYTES # BLD AUTO: 0.75 X10(3) UL (ref 0.1–1)
MONOCYTES NFR BLD AUTO: 5.9 %
NEUTROPHILS # BLD AUTO: 10.17 X10 (3) UL (ref 1.5–7.7)
NEUTROPHILS # BLD AUTO: 10.17 X10(3) UL (ref 1.5–7.7)
NEUTROPHILS NFR BLD AUTO: 79.6 %
PLATELET # BLD AUTO: 148 10(3)UL (ref 150–450)
RBC # BLD AUTO: 4.66 X10(6)UL
RH BLOOD TYPE: NEGATIVE
SARS-COV-2 RNA RESP QL NAA+PROBE: NOT DETECTED
T PALLIDUM AB SER QL IA: NONREACTIVE
WBC # BLD AUTO: 12.8 X10(3) UL (ref 4–11)

## 2022-07-13 PROCEDURE — 85025 COMPLETE CBC W/AUTO DIFF WBC: CPT | Performed by: OBSTETRICS & GYNECOLOGY

## 2022-07-13 PROCEDURE — 86870 RBC ANTIBODY IDENTIFICATION: CPT | Performed by: OBSTETRICS & GYNECOLOGY

## 2022-07-13 PROCEDURE — 86901 BLOOD TYPING SEROLOGIC RH(D): CPT | Performed by: OBSTETRICS & GYNECOLOGY

## 2022-07-13 PROCEDURE — 85461 HEMOGLOBIN FETAL: CPT | Performed by: OBSTETRICS & GYNECOLOGY

## 2022-07-13 PROCEDURE — 10907ZC DRAINAGE OF AMNIOTIC FLUID, THERAPEUTIC FROM PRODUCTS OF CONCEPTION, VIA NATURAL OR ARTIFICIAL OPENING: ICD-10-PCS | Performed by: OBSTETRICS & GYNECOLOGY

## 2022-07-13 PROCEDURE — 86780 TREPONEMA PALLIDUM: CPT | Performed by: OBSTETRICS & GYNECOLOGY

## 2022-07-13 PROCEDURE — 86850 RBC ANTIBODY SCREEN: CPT | Performed by: OBSTETRICS & GYNECOLOGY

## 2022-07-13 PROCEDURE — 99214 OFFICE O/P EST MOD 30 MIN: CPT

## 2022-07-13 PROCEDURE — 86900 BLOOD TYPING SEROLOGIC ABO: CPT | Performed by: OBSTETRICS & GYNECOLOGY

## 2022-07-13 RX ORDER — BISACODYL 10 MG
10 SUPPOSITORY, RECTAL RECTAL ONCE AS NEEDED
Status: DISCONTINUED | OUTPATIENT
Start: 2022-07-13 | End: 2022-07-15

## 2022-07-13 RX ORDER — IBUPROFEN 600 MG/1
600 TABLET ORAL EVERY 6 HOURS PRN
Status: DISCONTINUED | OUTPATIENT
Start: 2022-07-13 | End: 2022-07-13

## 2022-07-13 RX ORDER — SODIUM CHLORIDE, SODIUM LACTATE, POTASSIUM CHLORIDE, CALCIUM CHLORIDE 600; 310; 30; 20 MG/100ML; MG/100ML; MG/100ML; MG/100ML
INJECTION, SOLUTION INTRAVENOUS CONTINUOUS
Status: DISCONTINUED | OUTPATIENT
Start: 2022-07-13 | End: 2022-07-15

## 2022-07-13 RX ORDER — ACETAMINOPHEN 500 MG
500 TABLET ORAL EVERY 6 HOURS PRN
Status: DISCONTINUED | OUTPATIENT
Start: 2022-07-13 | End: 2022-07-15

## 2022-07-13 RX ORDER — IBUPROFEN 600 MG/1
600 TABLET ORAL EVERY 6 HOURS
Status: DISCONTINUED | OUTPATIENT
Start: 2022-07-13 | End: 2022-07-15

## 2022-07-13 RX ORDER — TRISODIUM CITRATE DIHYDRATE AND CITRIC ACID MONOHYDRATE 500; 334 MG/5ML; MG/5ML
30 SOLUTION ORAL AS NEEDED
Status: DISCONTINUED | OUTPATIENT
Start: 2022-07-13 | End: 2022-07-15

## 2022-07-13 RX ORDER — SIMETHICONE 80 MG
80 TABLET,CHEWABLE ORAL 3 TIMES DAILY PRN
Status: DISCONTINUED | OUTPATIENT
Start: 2022-07-13 | End: 2022-07-15

## 2022-07-13 RX ORDER — HYDROMORPHONE HYDROCHLORIDE 1 MG/ML
1 INJECTION, SOLUTION INTRAMUSCULAR; INTRAVENOUS; SUBCUTANEOUS EVERY 2 HOUR PRN
Status: DISCONTINUED | OUTPATIENT
Start: 2022-07-13 | End: 2022-07-15

## 2022-07-13 RX ORDER — TERBUTALINE SULFATE 1 MG/ML
0.25 INJECTION, SOLUTION SUBCUTANEOUS AS NEEDED
Status: DISCONTINUED | OUTPATIENT
Start: 2022-07-13 | End: 2022-07-15

## 2022-07-13 RX ORDER — ONDANSETRON 2 MG/ML
4 INJECTION INTRAMUSCULAR; INTRAVENOUS EVERY 6 HOURS PRN
Status: DISCONTINUED | OUTPATIENT
Start: 2022-07-13 | End: 2022-07-15

## 2022-07-13 RX ORDER — DEXTROSE, SODIUM CHLORIDE, SODIUM LACTATE, POTASSIUM CHLORIDE, AND CALCIUM CHLORIDE 5; .6; .31; .03; .02 G/100ML; G/100ML; G/100ML; G/100ML; G/100ML
INJECTION, SOLUTION INTRAVENOUS AS NEEDED
Status: DISCONTINUED | OUTPATIENT
Start: 2022-07-13 | End: 2022-07-15

## 2022-07-13 RX ORDER — DOCUSATE SODIUM 100 MG/1
100 CAPSULE, LIQUID FILLED ORAL
Status: DISCONTINUED | OUTPATIENT
Start: 2022-07-13 | End: 2022-07-15

## 2022-07-13 RX ORDER — AMMONIA INHALANTS 0.04 G/.3ML
0.3 INHALANT RESPIRATORY (INHALATION) AS NEEDED
Status: DISCONTINUED | OUTPATIENT
Start: 2022-07-13 | End: 2022-07-15

## 2022-07-13 RX ORDER — ACETAMINOPHEN 500 MG
1000 TABLET ORAL EVERY 6 HOURS PRN
Status: DISCONTINUED | OUTPATIENT
Start: 2022-07-13 | End: 2022-07-15

## 2022-07-13 NOTE — CONSULTS
35 y/o  at 40 weeks in labor. 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 I discussed with her that she likely has scar tissue throughout epidural space considering that her scar goes down to her sacrum. I explained that the normal small risks of post dural puncture headache and inadequate analgesia are increased in her for these reasons. The tactile sensation we use when entering the epidural space may be altered so the risk of entering the spinal space with the 17G Touhy is higher, and the treatment for post dural puncture headache is an epidural blood patch, so she may get another inadvertent dural puncture. I also explained that scar tissue in the epidural space may create septations so that she does not have adequate analgesia, but I told her I would be willing to attempt an epidural if she wants, but I would not do multiple attempts. Patient voiced understanding and said she was previously told by her surgeon that she would likely not be a candidate for an epidural for labor. She would like to try IV analgesics but said if the pain becomes intolerable, she might request one, and I agreed that sounded reasonable.

## 2022-07-13 NOTE — PROGRESS NOTES
Pt is a 34year old female admitted to TRG[facility]A. Patient presents with:  R/o Labor: C/o UC since 1 am with scant bleeding since 530 today. Denies LOF. Not aware of FM at this time. Pt is  40w0d intra-uterine pregnancy. History obtained, consents signed. Oriented to room, staff, and plan of care.

## 2022-07-14 LAB
BASOPHILS # BLD AUTO: 0.03 X10(3) UL (ref 0–0.2)
BASOPHILS NFR BLD AUTO: 0.2 %
EOSINOPHIL # BLD AUTO: 0.02 X10(3) UL (ref 0–0.7)
EOSINOPHIL NFR BLD AUTO: 0.1 %
ERYTHROCYTE [DISTWIDTH] IN BLOOD BY AUTOMATED COUNT: 13.2 %
HCT VFR BLD AUTO: 35.6 %
HGB BLD-MCNC: 12.1 G/DL
IMM GRANULOCYTES # BLD AUTO: 0.14 X10(3) UL (ref 0–1)
IMM GRANULOCYTES NFR BLD: 0.7 %
LYMPHOCYTES # BLD AUTO: 2.12 X10(3) UL (ref 1–4)
LYMPHOCYTES NFR BLD AUTO: 11.3 %
MCH RBC QN AUTO: 31.3 PG (ref 26–34)
MCHC RBC AUTO-ENTMCNC: 34 G/DL (ref 31–37)
MCV RBC AUTO: 92 FL
MONOCYTES # BLD AUTO: 1.33 X10(3) UL (ref 0.1–1)
MONOCYTES NFR BLD AUTO: 7.1 %
NEUTROPHILS # BLD AUTO: 15.12 X10 (3) UL (ref 1.5–7.7)
NEUTROPHILS # BLD AUTO: 15.12 X10(3) UL (ref 1.5–7.7)
NEUTROPHILS NFR BLD AUTO: 80.6 %
PLATELET # BLD AUTO: 152 10(3)UL (ref 150–450)
RBC # BLD AUTO: 3.87 X10(6)UL
WBC # BLD AUTO: 18.8 X10(3) UL (ref 4–11)

## 2022-07-14 PROCEDURE — 85025 COMPLETE CBC W/AUTO DIFF WBC: CPT | Performed by: OBSTETRICS & GYNECOLOGY

## 2022-07-14 PROCEDURE — 3E0334Z INTRODUCTION OF SERUM, TOXOID AND VACCINE INTO PERIPHERAL VEIN, PERCUTANEOUS APPROACH: ICD-10-PCS | Performed by: OBSTETRICS & GYNECOLOGY

## 2022-07-14 NOTE — L&D DELIVERY NOTE
Daiana Aguirre Boy [[id number]]    Labor Events     labor?: No   steroids?: None  Antibiotics received during labor?: No  Antibiotics (enter # doses in comment): none  Rupture date/time: 2022 0927     Rupture type: AROM  Fluid color: Meconium  Induction: None  Augmentation: AROM  Indications for augmentation: Ineffective Contraction Pattern  Intrapartum & labor complications: Meconium     Labor Event Times    Labor onset date/time: 2022 0100  Dilation complete date/time: 2022  Start pushing date/time: 2022      Presentation    Presentation: Vertex  Position: Right Occiput Anterior     Operative Delivery    Operative Vaginal Delivery: No            Shoulder Dystocia    Shoulder Dystocia: No     Anesthesia    Method: Local   Analgesics:  Analgesics   DILAUDID 1 MG/ML IJ SOLN         [place] Delivery    Head delivery date/time: 2022 20:06:31   Delivery date/time:  22 20:06:48   Delivery type: Normal spontaneous vaginal delivery    Details:     Delivery location: delivery room     Delivery Providers    Delivering Clinician: Renard Waggoner MD   Delivery personnel:  Provider Role   Kirk Waldrop, RN Baby Nurse   Domo Huffman RN Delivery Nurse         Cord    Vessels: 3 Vessels  Complications: Nuchal  # of loops: 1  Timed cord clamping: Yes  Time in sec: 30  Cord blood disposition: to lab  Gases sent?: No     Resuscitation    Method: None     [place] Measurements    Weight: 3710 g 8 lb 2.9 oz Length: 53.3 cm   Head circum.: 33.5 cm Chest circum.: 33.5 cm      Abdominal circum.: 30.5 cm       Placenta    Date/time: 2022  Removal: Expressed  Appearance: Intact  Disposition: held for future pathology     Apgars    Living status: Living   Apgar Scoring Key:    0 1 2    Skin color Blue or pale Acrocyanotic Completely pink    Heart rate Absent <100 bpm >100 bpm    Reflex irritability No response Grimace Cry or active withdrawal    Muscle tone Limp Some flexion Active motion    Respiratory effort Absent Weak cry; hypoventilation Good, crying              1 Minute:  5 Minute:  10 Minute:  15 Minute:  20 Minute:    Skin color: 0  1       Heart rate: 2  2       Reflex irritablity: 2  2       Muscle tone: 2  2       Respiratory effort: 2  2       Total: 8  9          Apgars assigned by: JODY FARIAS RN   disposition: with mother     Skin to Skin    No data filed     Vaginal Count    Initial count RN: Sulma Baum RN  Initial count Tech: Zoe Falling, Sigita   Sponges   Sharps    Initial counts 11   0    Final counts 21   3    Final count RN: Sulma Baum RN  Final count MD: Jay Jay Keating MD     Delivery (Maternal)    Episiotomy: Median  Indications for episiotomy: Maternal Exhaustion  Perineal lacerations: None    Vaginal laceration?: No    Cervical laceration?: No    Clitoral laceration?: No                                                                    Vaginal Delivery Note          Peterson Valverde Patient Status:  Inpatient    3/21/1990 MRN [id number]   Location [address] [place] [address] Attending Jay Jay Keating MD   Hosp Day # 0 PCP Kim Lam DO       Pre Op Dx:  IUP at 36 0/7weekl    Post Op Dx: Same    Procedure: Normal Spontaneous Vaginal Delivery    Surgeon: Rosy Narayanan    Anesthesia: local    EBL: see QBL    Findings: 1) A viable male infant with Apgars of 8 and 9 weighing 8lbs 3 oz was delivered in the ADORE  position. 2) 3 vessel cord. 3)Normal appearing placenta spontaneously delivered. 4)midline episiotomy    Procedure:  Patient is a 33y/o   who presented at 40 0/7 weeks gestation complaining of labor. Patient was admitted to labor and delivery. Her labor progressed and upon complete dilation she had a strong urge to push and so was encouraged to do so. Patient pushed for approximately 2hrs at which time the head was .   As the head was delivered the legs were lowered and the perineum was supported to decrease the risk of tearing. Infant was delivered in the ADORE position. The infants mouth and nose were bulb suctioned. The shoulders rotated easily and the anterior shoulder delivered easily followed by the posterior shoulder and remainder of the infant. The infant was dried and suctioned. The cord was doubly clamped and cut after 30secs. The baby was placed on the mothers abdomen vigorous and crying. The placenta spontaneously delivered intact shortly thereafter. Examination of the cervix, vagina, and perineum demonstrated a midline episiotomy. The vaginal laceration was repaired using 2-0 vicryl rapide in a running locked fashion. A deep crown stitch was placed and the perineal body was re-approximated using 2-0 vicryl rapide in an interupted fashion. The skin was re-approximated using 3-0 vicryl rapide. A recto-vaginal exam was normal and bleeding was minimal.  The patient was then moved to the supine position in stable condition. Counts were correct.     Complications:  None    Yennifer Abernathy MD  7/13/2022  8:31 PM

## 2022-07-14 NOTE — PROGRESS NOTES
Patient admitted from labor, ID bands and security sensors verified, oriented to unit and plan of care.

## 2022-07-14 NOTE — PROGRESS NOTES
Pt up to BR with assistance from this RN. Gait steady. Pt voids without difficulty. Fouzia-bottle given. Pt denies need for tucks, motrin, and Dermoplast at this time. Pt up to [facility] without incident.

## 2022-07-15 VITALS
RESPIRATION RATE: 17 BRPM | BODY MASS INDEX: 24.83 KG/M2 | TEMPERATURE: 98 F | WEIGHT: 149 LBS | HEART RATE: 86 BPM | DIASTOLIC BLOOD PRESSURE: 67 MMHG | SYSTOLIC BLOOD PRESSURE: 106 MMHG | HEIGHT: 65 IN

## 2022-07-15 LAB
BASOPHILS # BLD AUTO: 0.04 X10(3) UL (ref 0–0.2)
BASOPHILS NFR BLD AUTO: 0.3 %
EOSINOPHIL # BLD AUTO: 0.09 X10(3) UL (ref 0–0.7)
EOSINOPHIL NFR BLD AUTO: 0.7 %
ERYTHROCYTE [DISTWIDTH] IN BLOOD BY AUTOMATED COUNT: 13.2 %
HCT VFR BLD AUTO: 35.5 %
HGB BLD-MCNC: 11.7 G/DL
IMM GRANULOCYTES # BLD AUTO: 0.06 X10(3) UL (ref 0–1)
IMM GRANULOCYTES NFR BLD: 0.5 %
LYMPHOCYTES # BLD AUTO: 2.96 X10(3) UL (ref 1–4)
LYMPHOCYTES NFR BLD AUTO: 24.1 %
MCH RBC QN AUTO: 31.4 PG (ref 26–34)
MCHC RBC AUTO-ENTMCNC: 33 G/DL (ref 31–37)
MCV RBC AUTO: 95.2 FL
MONOCYTES # BLD AUTO: 0.75 X10(3) UL (ref 0.1–1)
MONOCYTES NFR BLD AUTO: 6.1 %
NEUTROPHILS # BLD AUTO: 8.36 X10 (3) UL (ref 1.5–7.7)
NEUTROPHILS # BLD AUTO: 8.36 X10(3) UL (ref 1.5–7.7)
NEUTROPHILS NFR BLD AUTO: 68.3 %
PLATELET # BLD AUTO: 143 10(3)UL (ref 150–450)
RBC # BLD AUTO: 3.73 X10(6)UL
WBC # BLD AUTO: 12.3 X10(3) UL (ref 4–11)

## 2022-07-15 PROCEDURE — 85025 COMPLETE CBC W/AUTO DIFF WBC: CPT | Performed by: OBSTETRICS & GYNECOLOGY

## 2022-07-15 NOTE — PROGRESS NOTES
All discharge teaching reviewed and questions answered. Hugs and kisses discharged from the system. Baby secure in car seat. Family escorted out by  [facility] Tanvi Borders. Conchis Samuels

## 2022-07-18 ENCOUNTER — TELEPHONE (OUTPATIENT)
Dept: OBGYN UNIT | Facility: HOSPITAL | Age: 32
End: 2022-07-18

## 2022-07-19 ENCOUNTER — TELEPHONE (OUTPATIENT)
Dept: OBGYN UNIT | Facility: HOSPITAL | Age: 32
End: 2022-07-19

## 2022-09-29 ENCOUNTER — WALK IN (OUTPATIENT)
Dept: URGENT CARE | Age: 32
End: 2022-09-29

## 2022-09-29 VITALS
SYSTOLIC BLOOD PRESSURE: 110 MMHG | DIASTOLIC BLOOD PRESSURE: 80 MMHG | HEART RATE: 80 BPM | RESPIRATION RATE: 16 BRPM | HEIGHT: 65 IN | BODY MASS INDEX: 20.66 KG/M2 | TEMPERATURE: 99 F | WEIGHT: 124 LBS

## 2022-09-29 DIAGNOSIS — J02.9 PHARYNGITIS, UNSPECIFIED ETIOLOGY: Primary | ICD-10-CM

## 2022-09-29 LAB
INTERNAL PROCEDURAL CONTROLS ACCEPTABLE: YES
S PYO AG THROAT QL IA.RAPID: NEGATIVE
TEST LOT EXPIRATION DATE: NORMAL
TEST LOT NUMBER: NORMAL

## 2022-09-29 PROCEDURE — 87880 STREP A ASSAY W/OPTIC: CPT | Performed by: NURSE PRACTITIONER

## 2022-09-29 PROCEDURE — 99203 OFFICE O/P NEW LOW 30 MIN: CPT | Performed by: NURSE PRACTITIONER

## 2022-09-29 ASSESSMENT — ENCOUNTER SYMPTOMS
SHORTNESS OF BREATH: 0
ACTIVITY CHANGE: 0
FATIGUE: 0
DIAPHORESIS: 0
ABDOMINAL PAIN: 0
NAUSEA: 0
EYE DISCHARGE: 0
WHEEZING: 0
DIARRHEA: 0
CONSTIPATION: 0
SINUS PAIN: 0
RHINORRHEA: 0
NUMBNESS: 0
VOMITING: 0
SORE THROAT: 1
DIZZINESS: 0
EYE ITCHING: 0
PHOTOPHOBIA: 0
CHEST TIGHTNESS: 0
APPETITE CHANGE: 0
HEADACHES: 1
FACIAL SWELLING: 0
WEAKNESS: 0
FEVER: 0
CHILLS: 0
COLOR CHANGE: 0
TROUBLE SWALLOWING: 0
SINUS PRESSURE: 0
LIGHT-HEADEDNESS: 0
COUGH: 0
EYE PAIN: 0
EYE REDNESS: 0

## 2022-10-11 ENCOUNTER — TELEPHONE (OUTPATIENT)
Dept: SCHEDULING | Age: 32
End: 2022-10-11

## 2022-10-11 ENCOUNTER — WALK IN (OUTPATIENT)
Dept: URGENT CARE | Age: 32
End: 2022-10-11

## 2022-10-11 VITALS
SYSTOLIC BLOOD PRESSURE: 110 MMHG | TEMPERATURE: 99.5 F | BODY MASS INDEX: 20.8 KG/M2 | HEART RATE: 99 BPM | OXYGEN SATURATION: 99 % | RESPIRATION RATE: 16 BRPM | WEIGHT: 125 LBS | DIASTOLIC BLOOD PRESSURE: 62 MMHG

## 2022-10-11 DIAGNOSIS — U07.1 COVID-19: Primary | ICD-10-CM

## 2022-10-11 LAB
FLUAV AG UPPER RESP QL IA.RAPID: NEGATIVE
FLUBV AG UPPER RESP QL IA.RAPID: NEGATIVE
SARS-COV+SARS-COV-2 AG RESP QL IA.RAPID: DETECTED

## 2022-10-11 PROCEDURE — 99213 OFFICE O/P EST LOW 20 MIN: CPT | Performed by: REGISTERED NURSE

## 2022-10-11 PROCEDURE — 87428 SARSCOV & INF VIR A&B AG IA: CPT | Performed by: REGISTERED NURSE

## 2022-10-11 ASSESSMENT — ENCOUNTER SYMPTOMS
CHILLS: 1
FEVER: 1
FATIGUE: 1

## 2022-10-31 NOTE — MR AVS SNAPSHOT
After Visit Summary   9/23/2021    Jhoan Hawkins   MRN: [id number]           Visit Information     Date & Time  9/23/2021  3:00 PM Provider  Ish Reddy, [address] [place], [place] [address].  Phone  [phone] See anesthesia record for heparin administration.  2000 units or health savings card. Not active on [employer]? Ask us how to get signed up today! If you receive a survey from [employer], please take a few minutes to complete it and provide feedback.  We strive to deliver the best patient experience and are lo

## 2022-12-03 ENCOUNTER — TELEPHONE (OUTPATIENT)
Dept: INTERNAL MEDICINE CLINIC | Facility: CLINIC | Age: 32
End: 2022-12-03

## 2022-12-03 NOTE — TELEPHONE ENCOUNTER
Future Appointments   Date Time Provider Deanne Bernardo   12/19/2022 11:30 AM Arnie Myers DO [facility] [address] [facility]     Pt sched herself for \"anxiety\"

## 2022-12-05 NOTE — TELEPHONE ENCOUNTER
Spoke to pt. Offered 12/7/22 appt at 5:15. Pt agreeable. Appt changed on AMS schedule.      FYI to AMS

## 2022-12-05 NOTE — TELEPHONE ENCOUNTER
LOV 7/7/21 with AMS. 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 Pt states there were a few times that she woke up in the middle of the night with her heart racing, did vomit once or twice but did not feels sick. Pt states she has never had a panic attack, no SI or HI. Pt has an appt scheduled for 12/19/22 which is a day off work but would like to get in sooner but has to be after 4:30pm.  OK to keep as is or fit pt in? AMS, please advise.

## 2022-12-07 ENCOUNTER — OFFICE VISIT (OUTPATIENT)
Dept: INTERNAL MEDICINE CLINIC | Facility: CLINIC | Age: 32
End: 2022-12-07
Payer: COMMERCIAL

## 2022-12-07 VITALS
HEART RATE: 87 BPM | WEIGHT: 108.38 LBS | BODY MASS INDEX: 18.06 KG/M2 | OXYGEN SATURATION: 100 % | DIASTOLIC BLOOD PRESSURE: 60 MMHG | SYSTOLIC BLOOD PRESSURE: 118 MMHG | HEIGHT: 65 IN

## 2022-12-07 DIAGNOSIS — R00.0 TACHYCARDIA: Primary | ICD-10-CM

## 2022-12-07 PROCEDURE — 3074F SYST BP LT 130 MM HG: CPT | Performed by: FAMILY MEDICINE

## 2022-12-07 PROCEDURE — 99214 OFFICE O/P EST MOD 30 MIN: CPT | Performed by: FAMILY MEDICINE

## 2022-12-07 PROCEDURE — 3008F BODY MASS INDEX DOCD: CPT | Performed by: FAMILY MEDICINE

## 2022-12-07 PROCEDURE — 3078F DIAST BP <80 MM HG: CPT | Performed by: FAMILY MEDICINE

## 2022-12-10 ENCOUNTER — LAB ENCOUNTER (OUTPATIENT)
Dept: LAB | Age: 32
End: 2022-12-10
Attending: FAMILY MEDICINE
Payer: COMMERCIAL

## 2022-12-10 DIAGNOSIS — R00.0 TACHYCARDIA: ICD-10-CM

## 2022-12-10 LAB
ALBUMIN SERPL-MCNC: 4.3 G/DL (ref 3.4–5)
ALBUMIN/GLOB SERPL: 1.4 {RATIO} (ref 1–2)
ALP LIVER SERPL-CCNC: 60 U/L
ALT SERPL-CCNC: 20 U/L
ANION GAP SERPL CALC-SCNC: 6 MMOL/L (ref 0–18)
AST SERPL-CCNC: 12 U/L (ref 15–37)
BASOPHILS # BLD AUTO: 0.03 X10(3) UL (ref 0–0.2)
BASOPHILS NFR BLD AUTO: 0.5 %
BILIRUB SERPL-MCNC: 0.5 MG/DL (ref 0.1–2)
BUN BLD-MCNC: 8 MG/DL (ref 7–18)
CALCIUM BLD-MCNC: 9.8 MG/DL (ref 8.5–10.1)
CHLORIDE SERPL-SCNC: 109 MMOL/L (ref 98–112)
CO2 SERPL-SCNC: 25 MMOL/L (ref 21–32)
CREAT BLD-MCNC: 0.72 MG/DL
DEPRECATED HBV CORE AB SER IA-ACNC: 59.3 NG/ML
EOSINOPHIL # BLD AUTO: 0.11 X10(3) UL (ref 0–0.7)
EOSINOPHIL NFR BLD AUTO: 1.9 %
ERYTHROCYTE [DISTWIDTH] IN BLOOD BY AUTOMATED COUNT: 12.9 %
ESTRADIOL SERPL-MCNC: 84.3 PG/ML
FASTING STATUS PATIENT QL REPORTED: YES
FSH SERPL-ACNC: 1.6 MIU/ML
GFR SERPLBLD BASED ON 1.73 SQ M-ARVRAT: 113 ML/MIN/1.73M2 (ref 60–?)
GLOBULIN PLAS-MCNC: 3.1 G/DL (ref 2.8–4.4)
GLUCOSE BLD-MCNC: 80 MG/DL (ref 70–99)
HCT VFR BLD AUTO: 42.7 %
HGB BLD-MCNC: 14.1 G/DL
IMM GRANULOCYTES # BLD AUTO: 0.01 X10(3) UL (ref 0–1)
IMM GRANULOCYTES NFR BLD: 0.2 %
IRON SATN MFR SERPL: 18 %
IRON SERPL-MCNC: 65 UG/DL
LH SERPL-ACNC: 2.7 MIU/ML
LYMPHOCYTES # BLD AUTO: 2.07 X10(3) UL (ref 1–4)
LYMPHOCYTES NFR BLD AUTO: 35 %
MCH RBC QN AUTO: 30.3 PG (ref 26–34)
MCHC RBC AUTO-ENTMCNC: 33 G/DL (ref 31–37)
MCV RBC AUTO: 91.6 FL
MONOCYTES # BLD AUTO: 0.56 X10(3) UL (ref 0.1–1)
MONOCYTES NFR BLD AUTO: 9.5 %
NEUTROPHILS # BLD AUTO: 3.13 X10 (3) UL (ref 1.5–7.7)
NEUTROPHILS # BLD AUTO: 3.13 X10(3) UL (ref 1.5–7.7)
NEUTROPHILS NFR BLD AUTO: 52.9 %
OSMOLALITY SERPL CALC.SUM OF ELEC: 287 MOSM/KG (ref 275–295)
PLATELET # BLD AUTO: 249 10(3)UL (ref 150–450)
POTASSIUM SERPL-SCNC: 4.1 MMOL/L (ref 3.5–5.1)
PROT SERPL-MCNC: 7.4 G/DL (ref 6.4–8.2)
RBC # BLD AUTO: 4.66 X10(6)UL
SODIUM SERPL-SCNC: 140 MMOL/L (ref 136–145)
TIBC SERPL-MCNC: 362 UG/DL (ref 240–450)
TRANSFERRIN SERPL-MCNC: 243 MG/DL (ref 200–360)
TSI SER-ACNC: 1.31 MIU/ML (ref 0.36–3.74)
WBC # BLD AUTO: 5.9 X10(3) UL (ref 4–11)

## 2022-12-10 PROCEDURE — 83540 ASSAY OF IRON: CPT | Performed by: FAMILY MEDICINE

## 2022-12-10 PROCEDURE — 83550 IRON BINDING TEST: CPT | Performed by: FAMILY MEDICINE

## 2022-12-10 PROCEDURE — 83002 ASSAY OF GONADOTROPIN (LH): CPT | Performed by: FAMILY MEDICINE

## 2022-12-10 PROCEDURE — 82670 ASSAY OF TOTAL ESTRADIOL: CPT | Performed by: FAMILY MEDICINE

## 2022-12-10 PROCEDURE — 80050 GENERAL HEALTH PANEL: CPT | Performed by: FAMILY MEDICINE

## 2022-12-10 PROCEDURE — 82728 ASSAY OF FERRITIN: CPT | Performed by: FAMILY MEDICINE

## 2022-12-10 PROCEDURE — 83001 ASSAY OF GONADOTROPIN (FSH): CPT | Performed by: FAMILY MEDICINE

## 2022-12-19 ENCOUNTER — HOSPITAL ENCOUNTER (OUTPATIENT)
Dept: CV DIAGNOSTICS | Facility: HOSPITAL | Age: 32
Discharge: HOME | End: 2022-12-19
Attending: FAMILY MEDICINE
Payer: COMMERCIAL

## 2022-12-19 DIAGNOSIS — R00.0 TACHYCARDIA: ICD-10-CM

## 2022-12-19 PROCEDURE — 93271 ECG/MONITORING AND ANALYSIS: CPT | Performed by: FAMILY MEDICINE

## 2022-12-19 PROCEDURE — 93270 REMOTE 30 DAY ECG REV/REPORT: CPT | Performed by: FAMILY MEDICINE

## 2023-02-22 ENCOUNTER — TELEPHONE (OUTPATIENT)
Dept: INTERNAL MEDICINE CLINIC | Facility: CLINIC | Age: 33
End: 2023-02-22

## 2023-02-24 ENCOUNTER — TELEPHONE (OUTPATIENT)
Dept: INTERNAL MEDICINE CLINIC | Facility: CLINIC | Age: 33
End: 2023-02-24

## 2023-02-24 NOTE — TELEPHONE ENCOUNTER
Hold for results. Patient contacted and given ER warnings and scheduled Monday at 5:30 for evaluation.

## 2023-02-24 NOTE — TELEPHONE ENCOUNTER
William Aguiar called from [address] results were not sent to Dr Kirstin benites to read, notified STAT read needed, [facility] put in a ticket at [facility] for the issue. William Aguiar is leaving for the day but Genny Calvillo at her office will be watching for these results to come through.

## 2023-02-24 NOTE — TELEPHONE ENCOUNTER
Patient reports ongoing symptoms since November intermit. Last episode reported ongoing since Tuesday. Feels increased heart rate, nausea, tiredness, and diarrhea. She is aware of [address] and ER recs. Still awaiting read on 30 day holter she dropped off 1/23/2023. Called [facility] as still waiting for [facility] sign off on read. They garnett end message out to him. Last completed in December were normal, just awaiting holter results. She wears sports watch and current HR is 94 while sitting. HR ranges from  pending on activity. She does not feel r/t anxiety. Denies cp, sob. Ok to schedule for Monday unless worsening over the weekend to be seen in [address]?

## 2023-02-24 NOTE — TELEPHONE ENCOUNTER
Patient is experiencing heart palpitations again that started on Tuesday morning and have continued since then. Patient states when this happens she gets nauseous and has diarrhea. Since Tuesday patient has not really ate or drank anything.

## 2023-02-27 ENCOUNTER — OFFICE VISIT (OUTPATIENT)
Dept: INTERNAL MEDICINE CLINIC | Facility: CLINIC | Age: 33
End: 2023-02-27
Payer: COMMERCIAL

## 2023-02-27 VITALS
BODY MASS INDEX: 17.33 KG/M2 | HEIGHT: 65 IN | HEART RATE: 81 BPM | OXYGEN SATURATION: 98 % | WEIGHT: 104 LBS | DIASTOLIC BLOOD PRESSURE: 60 MMHG | SYSTOLIC BLOOD PRESSURE: 116 MMHG

## 2023-02-27 DIAGNOSIS — R19.7 DIARRHEA, UNSPECIFIED TYPE: ICD-10-CM

## 2023-02-27 DIAGNOSIS — R00.0 TACHYCARDIA: Primary | ICD-10-CM

## 2023-02-27 PROCEDURE — 3008F BODY MASS INDEX DOCD: CPT | Performed by: FAMILY MEDICINE

## 2023-02-27 PROCEDURE — 99214 OFFICE O/P EST MOD 30 MIN: CPT | Performed by: FAMILY MEDICINE

## 2023-02-27 PROCEDURE — 3078F DIAST BP <80 MM HG: CPT | Performed by: FAMILY MEDICINE

## 2023-02-27 PROCEDURE — 3074F SYST BP LT 130 MM HG: CPT | Performed by: FAMILY MEDICINE

## 2023-02-27 RX ORDER — ALPRAZOLAM 0.25 MG/1
0.25 TABLET ORAL 2 TIMES DAILY PRN
Qty: 20 TABLET | Refills: 0 | Status: SHIPPED | OUTPATIENT
Start: 2023-02-27

## 2023-02-27 RX ORDER — ONDANSETRON 4 MG/1
TABLET, ORALLY DISINTEGRATING ORAL
Qty: 12 TABLET | Refills: 0 | Status: SHIPPED | OUTPATIENT
Start: 2023-02-27

## 2023-03-18 ENCOUNTER — LAB ENCOUNTER (OUTPATIENT)
Dept: LAB | Age: 33
End: 2023-03-18
Attending: FAMILY MEDICINE
Payer: COMMERCIAL

## 2023-03-18 DIAGNOSIS — R00.0 TACHYCARDIA: ICD-10-CM

## 2023-03-18 LAB — TSI SER-ACNC: 1.42 MIU/ML (ref 0.36–3.74)

## 2023-03-18 PROCEDURE — 84443 ASSAY THYROID STIM HORMONE: CPT | Performed by: FAMILY MEDICINE

## 2023-05-20 ENCOUNTER — LAB ENCOUNTER (OUTPATIENT)
Dept: LAB | Age: 33
End: 2023-05-20
Attending: FAMILY MEDICINE
Payer: COMMERCIAL

## 2023-05-20 DIAGNOSIS — R00.0 TACHYCARDIA: ICD-10-CM

## 2023-05-20 PROCEDURE — 83835 ASSAY OF METANEPHRINES: CPT | Performed by: FAMILY MEDICINE

## 2023-06-02 LAB
24 HR CREATININE, URINE: 905 MG/24 HR
CREATININE, URINE: 67 MG/DL
TOTAL METANEPHRINES, URINE: 203 UG/24 H
TOTAL METANEPHRINES/CRT RATIO: 224 UG/G

## 2023-06-03 ENCOUNTER — PATIENT MESSAGE (OUTPATIENT)
Dept: INTERNAL MEDICINE CLINIC | Facility: CLINIC | Age: 33
End: 2023-06-03

## 2023-06-05 NOTE — TELEPHONE ENCOUNTER
From: Hue Galeano  To: Annel Caceres DO  Sent: 6/3/2023 7:35 PM CDT  Subject: Question regarding METANEPHRINES, URINE, TOTAL    Thank you for letting me know my results. Yes I am still having the same symptoms.     Thanks

## 2023-06-17 ENCOUNTER — OFFICE VISIT (OUTPATIENT)
Dept: INTERNAL MEDICINE CLINIC | Facility: CLINIC | Age: 33
End: 2023-06-17
Payer: COMMERCIAL

## 2023-06-17 VITALS
BODY MASS INDEX: 16.83 KG/M2 | SYSTOLIC BLOOD PRESSURE: 108 MMHG | RESPIRATION RATE: 17 BRPM | HEIGHT: 65 IN | HEART RATE: 80 BPM | DIASTOLIC BLOOD PRESSURE: 70 MMHG | WEIGHT: 101 LBS | OXYGEN SATURATION: 98 %

## 2023-06-17 DIAGNOSIS — R11.0 NAUSEA: ICD-10-CM

## 2023-06-17 DIAGNOSIS — R63.4 WEIGHT LOSS: ICD-10-CM

## 2023-06-17 DIAGNOSIS — R19.7 DIARRHEA, UNSPECIFIED TYPE: Primary | ICD-10-CM

## 2023-06-17 DIAGNOSIS — R00.2 PALPITATIONS: ICD-10-CM

## 2023-06-17 PROCEDURE — 3008F BODY MASS INDEX DOCD: CPT | Performed by: FAMILY MEDICINE

## 2023-06-17 PROCEDURE — 99214 OFFICE O/P EST MOD 30 MIN: CPT | Performed by: FAMILY MEDICINE

## 2023-06-17 PROCEDURE — 3074F SYST BP LT 130 MM HG: CPT | Performed by: FAMILY MEDICINE

## 2023-06-17 PROCEDURE — 3078F DIAST BP <80 MM HG: CPT | Performed by: FAMILY MEDICINE

## 2023-06-17 RX ORDER — CITALOPRAM 10 MG/1
10 TABLET ORAL DAILY
Qty: 30 TABLET | Refills: 0 | Status: SHIPPED | OUTPATIENT
Start: 2023-06-17

## 2023-10-12 ENCOUNTER — OFFICE VISIT (OUTPATIENT)
Dept: ENDOCRINOLOGY CLINIC | Facility: CLINIC | Age: 33
End: 2023-10-12

## 2023-10-12 VITALS
SYSTOLIC BLOOD PRESSURE: 116 MMHG | DIASTOLIC BLOOD PRESSURE: 72 MMHG | BODY MASS INDEX: 17 KG/M2 | WEIGHT: 104 LBS | HEART RATE: 81 BPM

## 2023-10-12 DIAGNOSIS — R00.0 TACHYCARDIA: Primary | ICD-10-CM

## 2023-10-12 DIAGNOSIS — N92.6 IRREGULAR MENSES: ICD-10-CM

## 2023-10-13 ENCOUNTER — LAB ENCOUNTER (OUTPATIENT)
Dept: LAB | Age: 33
End: 2023-10-13
Attending: INTERNAL MEDICINE
Payer: COMMERCIAL

## 2023-10-13 DIAGNOSIS — R00.0 TACHYCARDIA: ICD-10-CM

## 2023-10-13 DIAGNOSIS — N92.6 IRREGULAR MENSES: ICD-10-CM

## 2023-10-13 LAB
CORTIS SERPL-MCNC: 8.8 UG/DL
ESTRADIOL SERPL-MCNC: 96.2 PG/ML
FSH SERPL-ACNC: 2.3 MIU/ML
LH SERPL-ACNC: 3.5 MIU/ML
PROGEST SERPL-MCNC: 12.8 NG/ML
T4 FREE SERPL-MCNC: 0.8 NG/DL (ref 0.8–1.7)
TSI SER-ACNC: 1.63 MIU/ML (ref 0.36–3.74)

## 2023-10-13 PROCEDURE — 83002 ASSAY OF GONADOTROPIN (LH): CPT

## 2023-10-13 PROCEDURE — 83835 ASSAY OF METANEPHRINES: CPT

## 2023-10-13 PROCEDURE — 84305 ASSAY OF SOMATOMEDIN: CPT

## 2023-10-13 PROCEDURE — 84443 ASSAY THYROID STIM HORMONE: CPT

## 2023-10-13 PROCEDURE — 84144 ASSAY OF PROGESTERONE: CPT

## 2023-10-13 PROCEDURE — 82024 ASSAY OF ACTH: CPT

## 2023-10-13 PROCEDURE — 83001 ASSAY OF GONADOTROPIN (FSH): CPT

## 2023-10-13 PROCEDURE — 36415 COLL VENOUS BLD VENIPUNCTURE: CPT

## 2023-10-13 PROCEDURE — 84439 ASSAY OF FREE THYROXINE: CPT

## 2023-10-13 PROCEDURE — 82533 TOTAL CORTISOL: CPT

## 2023-10-13 PROCEDURE — 82670 ASSAY OF TOTAL ESTRADIOL: CPT

## 2023-10-14 LAB — ACTH: 19.3 PG/ML

## 2023-10-15 PROBLEM — R00.0 TACHYCARDIA: Status: ACTIVE | Noted: 2023-10-15

## 2023-10-15 PROBLEM — N92.6 IRREGULAR MENSES: Status: ACTIVE | Noted: 2023-10-15

## 2023-10-15 LAB
IGF I: 171 NG/ML
IGF-1, Z SCORE: 0 S.D.

## 2023-10-19 ENCOUNTER — TELEPHONE (OUTPATIENT)
Dept: OBGYN CLINIC | Facility: CLINIC | Age: 33
End: 2023-10-19

## 2023-10-19 NOTE — TELEPHONE ENCOUNTER
Patient calling to initiate prenatal care  LMP 9-15-23  Patient is 7-8 weeks on 11-10-23  Confirmation Ultrasound and Appointment scheduled on   Future Appointments   Date Time Provider Department Center   11/16/2023 12:45 PM [facility] US [facility] [address]   11/16/2023  1:15 PM Chris Luna MD [facility] [address]   12/19/2023 11:30 AM Prakash Cohn MD [facility] [address]   1/11/2024  9:00 AM Jo-Ann Stout APRN [facility] O [facility] [place]         Any history of ectopic pregnancy? no  Any history of miscarriage? no  Any medications that you are taking on a regular basis other than prenatal vitamins? no (if not taking prenatal vitamins, encourage patient to start taking.)  Any bleeding since the first day of last LMP and your positive pregnancy test? no    Insurance bcbs ppo

## 2023-10-20 LAB
METANEPHRINE: 12.9 PG/ML
NORMETANEPHRINE: 83.6 PG/ML

## 2023-12-09 LAB
ANTIBODY SCREEN OB: NEGATIVE
HEPATITIS B SURFACE ANTIGEN OB: NEGATIVE
RAPID PLASMA REAGIN OB: NONREACTIVE

## 2024-01-05 ENCOUNTER — APPOINTMENT (OUTPATIENT)
Dept: ULTRASOUND IMAGING | Facility: HOSPITAL | Age: 34
End: 2024-01-05
Attending: EMERGENCY MEDICINE
Payer: COMMERCIAL

## 2024-01-05 ENCOUNTER — HOSPITAL ENCOUNTER (EMERGENCY)
Facility: HOSPITAL | Age: 34
Discharge: HOME | End: 2024-01-05
Attending: EMERGENCY MEDICINE
Payer: COMMERCIAL

## 2024-01-05 VITALS
BODY MASS INDEX: 17.83 KG/M2 | DIASTOLIC BLOOD PRESSURE: 89 MMHG | TEMPERATURE: 98 F | OXYGEN SATURATION: 99 % | WEIGHT: 107 LBS | HEIGHT: 65 IN | SYSTOLIC BLOOD PRESSURE: 109 MMHG | RESPIRATION RATE: 18 BRPM | HEART RATE: 95 BPM

## 2024-01-05 DIAGNOSIS — R10.31 ABDOMINAL PAIN, RIGHT LOWER QUADRANT: Primary | ICD-10-CM

## 2024-01-05 DIAGNOSIS — O44.01 PLACENTA PREVIA ANTEPARTUM IN FIRST TRIMESTER: ICD-10-CM

## 2024-01-05 LAB
ALBUMIN SERPL-MCNC: 3.6 G/DL (ref 3.4–5)
ALBUMIN/GLOB SERPL: 1 {RATIO} (ref 1–2)
ALP LIVER SERPL-CCNC: 53 U/L
ALT SERPL-CCNC: 18 U/L
ANION GAP SERPL CALC-SCNC: 7 MMOL/L (ref 0–18)
AST SERPL-CCNC: 10 U/L (ref 15–37)
BASOPHILS # BLD AUTO: 0.02 X10(3) UL (ref 0–0.2)
BASOPHILS NFR BLD AUTO: 0.2 %
BILIRUB SERPL-MCNC: 0.4 MG/DL (ref 0.1–2)
BILIRUB UR QL STRIP.AUTO: NEGATIVE
BUN BLD-MCNC: 5 MG/DL (ref 9–23)
CALCIUM BLD-MCNC: 8.9 MG/DL (ref 8.5–10.1)
CHLORIDE SERPL-SCNC: 107 MMOL/L (ref 98–112)
CLARITY UR REFRACT.AUTO: CLEAR
CO2 SERPL-SCNC: 22 MMOL/L (ref 21–32)
CREAT BLD-MCNC: 0.54 MG/DL
EGFRCR SERPLBLD CKD-EPI 2021: 124 ML/MIN/1.73M2 (ref 60–?)
EOSINOPHIL # BLD AUTO: 0.03 X10(3) UL (ref 0–0.7)
EOSINOPHIL NFR BLD AUTO: 0.4 %
ERYTHROCYTE [DISTWIDTH] IN BLOOD BY AUTOMATED COUNT: 13.4 %
GLOBULIN PLAS-MCNC: 3.5 G/DL (ref 2.8–4.4)
GLUCOSE BLD-MCNC: 83 MG/DL (ref 70–99)
GLUCOSE UR STRIP.AUTO-MCNC: NORMAL MG/DL
HCT VFR BLD AUTO: 35.4 %
HGB BLD-MCNC: 12.4 G/DL
IMM GRANULOCYTES # BLD AUTO: 0.02 X10(3) UL (ref 0–1)
IMM GRANULOCYTES NFR BLD: 0.2 %
KETONES UR STRIP.AUTO-MCNC: NEGATIVE MG/DL
LEUKOCYTE ESTERASE UR QL STRIP.AUTO: 250
LIPASE SERPL-CCNC: 40 U/L (ref 13–75)
LYMPHOCYTES # BLD AUTO: 1.98 X10(3) UL (ref 1–4)
LYMPHOCYTES NFR BLD AUTO: 24.6 %
MCH RBC QN AUTO: 30.2 PG (ref 26–34)
MCHC RBC AUTO-ENTMCNC: 35 G/DL (ref 31–37)
MCV RBC AUTO: 86.1 FL
MONOCYTES # BLD AUTO: 0.37 X10(3) UL (ref 0.1–1)
MONOCYTES NFR BLD AUTO: 4.6 %
NEUTROPHILS # BLD AUTO: 5.62 X10 (3) UL (ref 1.5–7.7)
NEUTROPHILS # BLD AUTO: 5.62 X10(3) UL (ref 1.5–7.7)
NEUTROPHILS NFR BLD AUTO: 70 %
NITRITE UR QL STRIP.AUTO: NEGATIVE
OSMOLALITY SERPL CALC.SUM OF ELEC: 278 MOSM/KG (ref 275–295)
PH UR STRIP.AUTO: 8 [PH] (ref 5–8)
PLATELET # BLD AUTO: 228 10(3)UL (ref 150–450)
POTASSIUM SERPL-SCNC: 4.1 MMOL/L (ref 3.5–5.1)
PROT SERPL-MCNC: 7.1 G/DL (ref 6.4–8.2)
PROT UR STRIP.AUTO-MCNC: NEGATIVE MG/DL
RBC # BLD AUTO: 4.11 X10(6)UL
RBC UR QL AUTO: NEGATIVE
SODIUM SERPL-SCNC: 136 MMOL/L (ref 136–145)
SP GR UR STRIP.AUTO: 1.01 (ref 1–1.03)
UROBILINOGEN UR STRIP.AUTO-MCNC: NORMAL MG/DL
WBC # BLD AUTO: 8 X10(3) UL (ref 4–11)

## 2024-01-05 PROCEDURE — 85025 COMPLETE CBC W/AUTO DIFF WBC: CPT | Performed by: EMERGENCY MEDICINE

## 2024-01-05 PROCEDURE — 96360 HYDRATION IV INFUSION INIT: CPT

## 2024-01-05 PROCEDURE — 76857 US EXAM PELVIC LIMITED: CPT | Performed by: EMERGENCY MEDICINE

## 2024-01-05 PROCEDURE — 99285 EMERGENCY DEPT VISIT HI MDM: CPT

## 2024-01-05 PROCEDURE — 96361 HYDRATE IV INFUSION ADD-ON: CPT

## 2024-01-05 PROCEDURE — 80053 COMPREHEN METABOLIC PANEL: CPT | Performed by: EMERGENCY MEDICINE

## 2024-01-05 PROCEDURE — 81001 URINALYSIS AUTO W/SCOPE: CPT | Performed by: EMERGENCY MEDICINE

## 2024-01-05 PROCEDURE — 83690 ASSAY OF LIPASE: CPT | Performed by: EMERGENCY MEDICINE

## 2024-01-05 PROCEDURE — 99284 EMERGENCY DEPT VISIT MOD MDM: CPT

## 2024-01-05 PROCEDURE — 76815 OB US LIMITED FETUS(S): CPT | Performed by: EMERGENCY MEDICINE

## 2024-01-05 RX ORDER — FAMOTIDINE 20 MG/1
20 TABLET, FILM COATED ORAL AS DIRECTED
COMMUNITY

## 2024-01-05 NOTE — ED INITIAL ASSESSMENT (HPI)
Pt here with intermittent abd cramping x 1 week, pt is 15 weeks pregnant, no fevers or chills. +diarrhea but no vomiting.

## 2024-01-05 NOTE — ED PROVIDER NOTES
Patient Seen in: [facility] Emergency Department      History     Chief Complaint   Patient presents with    Abdomen/Flank Pain    Pregnancy Issues     Stated Complaint:     Subjective:   HPI    Patient was to her gynecologist office today.  Patient is 15 weeks pregnant.  She had positive fetal heart tones at her doctor's office today.  About 9 days ago, patient had abdominal cramping.  Last week on Wednesday, patient had diarrhea.  She had 1 episode diarrhea again this last Saturday.  She had been having bowel movements about every other day which is her normal.  Patient reporting right lower quadrant abdominal pain.  She was noted to be tender and referred to the emergency department to rule out \"ovarian issues or appendicitis\"  Patient confirms a history.  She points to the right lower quadrant as location of discomfort.  No fever.  No vomiting.  No diarrhea.  No urinary symptoms other than frequency.  No rash in the area of her discomfort.  No vaginal bleeding or vaginal discharge.    Objective:   No pertinent past medical history.            No pertinent past surgical history.              No pertinent social history.            Review of Systems    Positive for stated complaint:   Other systems are as noted in HPI.  Constitutional and vital signs reviewed.      All other systems reviewed and negative except as noted above.    Physical Exam     ED Triage Vitals [01/05/24 1115]   /77   Pulse 92   Resp 16   Temp 98 °F (36.7 °C)   Temp src Temporal   SpO2 98 %   O2 Device None (Room air)       Current:/89   Pulse 95   Temp 98 °F (36.7 °C) (Temporal)   Resp 18   Ht 165.1 cm (5' 5\")   Wt 48.5 kg   LMP 09/01/2023 (Approximate)   SpO2 98%   BMI 17.81 kg/m²         Physical Exam  General: The patient is awake, alert, conversant.   Eyes: Sclera white  Abdomen: Soft, nondistended.  Tender in the right lower quadrant with maximal tenderness inferior to McBurney's.  No involuntary guarding,  rigidity, rebound.  Remainder the abdomen is completely benign.  No CVA tenderness  Extremities: Unremarkable.  Calves nonswollen, symmetric  Neurologic:  Mental status as above.  Patient moves all extremities with good strength and coordination.           ED Course     Labs Reviewed   COMP METABOLIC PANEL (14) - Abnormal; Notable for the following components:       Result Value    BUN 5 (*)     Creatinine 0.54 (*)     AST 10 (*)     All other components within normal limits   URINALYSIS, ROUTINE - Abnormal; Notable for the following components:    Leukocyte Esterase Urine 250 (*)     Bacteria Urine 3+ (*)     Squamous Epi. Cells Few (*)     All other components within normal limits   LIPASE - Normal   CBC WITH DIFFERENTIAL WITH PLATELET    Narrative:     The following orders were created for panel order CBC With Differential With Platelet.  Procedure                               Abnormality         Status                     ---------                               -----------         ------                     CBC W/ DIFFERENTIAL[[id number]]                              Final result                 Please view results for these tests on the individual orders.   CBC W/ DIFFERENTIAL                      MDM      Patient 15 weeks pregnant with right lower quadrant abdominal pain since Wednesday.   Of course, appendicitis include the differential however patient's abdominal examination is relatively benign with only mild tenderness and certainly no involuntary guarding or rebound.  Also, no associated symptoms such as fever, anorexia, or vomiting.  Ovarian pathology such as cyst also include in the differential.  Ectopic would seem very unlikely at this stage of pregnancy.  Patient without typical urinary tract fraction symptoms and no flank pain to suggest pyelonephritis  I discussed with patient that CT imaging would be the most sensitive for appendicitis however because she is pregnant, this is contraindicated.   Ultrasound ordered    Patient treated with IV fluid    CBC shows normal white count with no left shift on differential.  Hemoglobin and platelets are normal  Metabolic panel shows normal glucose and electrolytes.  Creatinine normal.  LFTs and lipase normal  Urinalysis shows negative nitrites with only 1-5 WBCs    Ultrasound appendix  I personally reviewed the actual radiographs themselves and my individual interpretation does not identify the appendix  radiologist's formal interpretation which I have reviewed  CONCLUSION:  Complete placenta previa is seen at this time.       Ultrasound abdomen  CONCLUSION:  Complete placenta previa is seen at this time.     On repeat examination, patient appears comfortable.  I reviewed the results of workup.  I discussed the option of further investigation with MR imaging.  Patient declined at this point.  She was comfortable being discharged home and closely monitoring her symptoms.  I advised  she return if her symptoms were to worsen in any way.  I recommend close follow-up with her gynecologist regarding the placenta previa.  She may have Tylenol for pain, rest and a light diet.                             Medical Decision Making      Disposition and Plan     Clinical Impression:  1. Abdominal pain, right lower quadrant    2. Placenta previa antepartum in first trimester         Disposition:  Discharge  1/5/2024  3:10 pm    Follow-up:  Catrachita Temple DO  [address] [place] [address], [address] [address]  [phone]    Follow up            Medications Prescribed:  Current Discharge Medication List

## 2024-05-30 LAB — STREP GP B CULT OB: POSITIVE

## 2024-06-01 LAB — HIV RESULT OB: NEGATIVE

## 2024-06-26 ENCOUNTER — HOSPITAL ENCOUNTER (OUTPATIENT)
Facility: HOSPITAL | Age: 34
Discharge: HOME | End: 2024-06-26
Attending: OBSTETRICS & GYNECOLOGY | Admitting: OBSTETRICS & GYNECOLOGY
Payer: COMMERCIAL

## 2024-06-26 VITALS
WEIGHT: 149 LBS | TEMPERATURE: 98 F | DIASTOLIC BLOOD PRESSURE: 78 MMHG | SYSTOLIC BLOOD PRESSURE: 122 MMHG | HEART RATE: 86 BPM | HEIGHT: 65 IN | RESPIRATION RATE: 20 BRPM | BODY MASS INDEX: 24.83 KG/M2

## 2024-06-26 PROCEDURE — 99213 OFFICE O/P EST LOW 20 MIN: CPT

## 2024-06-26 PROCEDURE — 59025 FETAL NON-STRESS TEST: CPT

## 2024-06-26 RX ORDER — CHOLECALCIFEROL (VITAMIN D3) 25 MCG
1 TABLET,CHEWABLE ORAL DAILY
COMMUNITY

## 2024-06-26 NOTE — NST
Nonstress Test   Patient: Bekah Agudelo    Gestation: 39w6d    NST:       Variability: Moderate           Accelerations: Yes           Decelerations: None            Baseline: 130 BPM           Uterine Irritability: No           Contractions: Regular                    Contraction Frequency: 3-4, patient reports feeling mild UC only when up walking                   Acoustic Stimulator: No           Nonstress Test Interpretation: Reactive                                 Additional Comments    Physician Evaluation      NST Interpretation: Reactive    Disposition:   Discharged    Comments:        Jonathan Andersen MD

## 2024-06-28 ENCOUNTER — HOSPITAL ENCOUNTER (INPATIENT)
Facility: HOSPITAL | Age: 34
LOS: 1 days | Discharge: HOME | End: 2024-06-29
Attending: OBSTETRICS & GYNECOLOGY | Admitting: OBSTETRICS & GYNECOLOGY
Payer: COMMERCIAL

## 2024-06-28 ENCOUNTER — APPOINTMENT (OUTPATIENT)
Dept: OBGYN CLINIC | Facility: HOSPITAL | Age: 34
End: 2024-06-28
Payer: COMMERCIAL

## 2024-06-28 LAB
ANTIBODY SCREEN: NEGATIVE
BASOPHILS # BLD AUTO: 0.02 X10(3) UL (ref 0–0.2)
BASOPHILS # BLD AUTO: 0.02 X10(3) UL (ref 0–0.2)
BASOPHILS NFR BLD AUTO: 0.1 %
BASOPHILS NFR BLD AUTO: 0.2 %
EOSINOPHIL # BLD AUTO: 0.03 X10(3) UL (ref 0–0.7)
EOSINOPHIL # BLD AUTO: 0.07 X10(3) UL (ref 0–0.7)
EOSINOPHIL NFR BLD AUTO: 0.2 %
EOSINOPHIL NFR BLD AUTO: 0.7 %
ERYTHROCYTE [DISTWIDTH] IN BLOOD BY AUTOMATED COUNT: 12.9 %
ERYTHROCYTE [DISTWIDTH] IN BLOOD BY AUTOMATED COUNT: 13.1 %
FETAL SCREEN RESULT: NEGATIVE
HCT VFR BLD AUTO: 35.1 %
HCT VFR BLD AUTO: 37.7 %
HGB BLD-MCNC: 11.8 G/DL
HGB BLD-MCNC: 13.2 G/DL
IMM GRANULOCYTES # BLD AUTO: 0.09 X10(3) UL (ref 0–1)
IMM GRANULOCYTES # BLD AUTO: 0.1 X10(3) UL (ref 0–1)
IMM GRANULOCYTES NFR BLD: 0.6 %
IMM GRANULOCYTES NFR BLD: 0.9 %
LYMPHOCYTES # BLD AUTO: 1.59 X10(3) UL (ref 1–4)
LYMPHOCYTES # BLD AUTO: 2.28 X10(3) UL (ref 1–4)
LYMPHOCYTES NFR BLD AUTO: 11.1 %
LYMPHOCYTES NFR BLD AUTO: 21.6 %
MCH RBC QN AUTO: 30.3 PG (ref 26–34)
MCH RBC QN AUTO: 30.7 PG (ref 26–34)
MCHC RBC AUTO-ENTMCNC: 33.6 G/DL (ref 31–37)
MCHC RBC AUTO-ENTMCNC: 35 G/DL (ref 31–37)
MCV RBC AUTO: 87.7 FL
MCV RBC AUTO: 90 FL
MONOCYTES # BLD AUTO: 0.63 X10(3) UL (ref 0.1–1)
MONOCYTES # BLD AUTO: 0.86 X10(3) UL (ref 0.1–1)
MONOCYTES NFR BLD AUTO: 6 %
MONOCYTES NFR BLD AUTO: 6 %
NEUTROPHILS # BLD AUTO: 11.74 X10 (3) UL (ref 1.5–7.7)
NEUTROPHILS # BLD AUTO: 11.74 X10(3) UL (ref 1.5–7.7)
NEUTROPHILS # BLD AUTO: 7.44 X10 (3) UL (ref 1.5–7.7)
NEUTROPHILS # BLD AUTO: 7.44 X10(3) UL (ref 1.5–7.7)
NEUTROPHILS NFR BLD AUTO: 70.6 %
NEUTROPHILS NFR BLD AUTO: 82 %
PLATELET # BLD AUTO: 156 10(3)UL (ref 150–450)
PLATELET # BLD AUTO: 158 10(3)UL (ref 150–450)
RBC # BLD AUTO: 3.9 X10(6)UL
RBC # BLD AUTO: 4.3 X10(6)UL
RH BLOOD TYPE: NEGATIVE
T PALLIDUM AB SER QL IA: NONREACTIVE
WBC # BLD AUTO: 10.5 X10(3) UL (ref 4–11)
WBC # BLD AUTO: 14.3 X10(3) UL (ref 4–11)

## 2024-06-28 PROCEDURE — 86850 RBC ANTIBODY SCREEN: CPT | Performed by: OBSTETRICS & GYNECOLOGY

## 2024-06-28 PROCEDURE — 86900 BLOOD TYPING SEROLOGIC ABO: CPT | Performed by: OBSTETRICS & GYNECOLOGY

## 2024-06-28 PROCEDURE — 86780 TREPONEMA PALLIDUM: CPT | Performed by: OBSTETRICS & GYNECOLOGY

## 2024-06-28 PROCEDURE — 85025 COMPLETE CBC W/AUTO DIFF WBC: CPT | Performed by: OBSTETRICS & GYNECOLOGY

## 2024-06-28 PROCEDURE — 86901 BLOOD TYPING SEROLOGIC RH(D): CPT | Performed by: OBSTETRICS & GYNECOLOGY

## 2024-06-28 PROCEDURE — 0HQ9XZZ REPAIR PERINEUM SKIN, EXTERNAL APPROACH: ICD-10-PCS | Performed by: OBSTETRICS & GYNECOLOGY

## 2024-06-28 PROCEDURE — 85461 HEMOGLOBIN FETAL: CPT | Performed by: OBSTETRICS & GYNECOLOGY

## 2024-06-28 RX ORDER — IBUPROFEN 600 MG/1
600 TABLET ORAL EVERY 6 HOURS
Status: DISCONTINUED | OUTPATIENT
Start: 2024-06-28 | End: 2024-06-29

## 2024-06-28 RX ORDER — NALOXONE HYDROCHLORIDE 0.4 MG/ML
INJECTION, SOLUTION INTRAMUSCULAR; INTRAVENOUS; SUBCUTANEOUS
Status: DISPENSED
Start: 2024-06-28 | End: 2024-06-28

## 2024-06-28 RX ORDER — DOCUSATE SODIUM 100 MG/1
100 CAPSULE, LIQUID FILLED ORAL
Status: DISCONTINUED | OUTPATIENT
Start: 2024-06-28 | End: 2024-06-29

## 2024-06-28 RX ORDER — TERBUTALINE SULFATE 1 MG/ML
0.25 INJECTION, SOLUTION SUBCUTANEOUS AS NEEDED
Status: DISCONTINUED | OUTPATIENT
Start: 2024-06-28 | End: 2024-06-28

## 2024-06-28 RX ORDER — IBUPROFEN 600 MG/1
600 TABLET ORAL ONCE AS NEEDED
Status: DISCONTINUED | OUTPATIENT
Start: 2024-06-28 | End: 2024-06-28

## 2024-06-28 RX ORDER — SIMETHICONE 80 MG
80 TABLET,CHEWABLE ORAL 3 TIMES DAILY PRN
Status: DISCONTINUED | OUTPATIENT
Start: 2024-06-28 | End: 2024-06-29

## 2024-06-28 RX ORDER — CITRIC ACID/SODIUM CITRATE 334-500MG
30 SOLUTION, ORAL ORAL AS NEEDED
Status: DISCONTINUED | OUTPATIENT
Start: 2024-06-28 | End: 2024-06-28

## 2024-06-28 RX ORDER — ONDANSETRON 2 MG/ML
4 INJECTION INTRAMUSCULAR; INTRAVENOUS EVERY 6 HOURS PRN
Status: DISCONTINUED | OUTPATIENT
Start: 2024-06-28 | End: 2024-06-28

## 2024-06-28 RX ORDER — ACETAMINOPHEN 500 MG
1000 TABLET ORAL EVERY 6 HOURS PRN
Status: DISCONTINUED | OUTPATIENT
Start: 2024-06-28 | End: 2024-06-28

## 2024-06-28 RX ORDER — DEXTROSE, SODIUM CHLORIDE, SODIUM LACTATE, POTASSIUM CHLORIDE, AND CALCIUM CHLORIDE 5; .6; .31; .03; .02 G/100ML; G/100ML; G/100ML; G/100ML; G/100ML
INJECTION, SOLUTION INTRAVENOUS AS NEEDED
Status: DISCONTINUED | OUTPATIENT
Start: 2024-06-28 | End: 2024-06-28

## 2024-06-28 RX ORDER — SODIUM CHLORIDE, SODIUM LACTATE, POTASSIUM CHLORIDE, CALCIUM CHLORIDE 600; 310; 30; 20 MG/100ML; MG/100ML; MG/100ML; MG/100ML
INJECTION, SOLUTION INTRAVENOUS CONTINUOUS
Status: DISCONTINUED | OUTPATIENT
Start: 2024-06-28 | End: 2024-06-28

## 2024-06-28 RX ORDER — ACETAMINOPHEN 500 MG
500 TABLET ORAL EVERY 6 HOURS PRN
Status: DISCONTINUED | OUTPATIENT
Start: 2024-06-28 | End: 2024-06-28

## 2024-06-28 RX ORDER — HYDROMORPHONE HYDROCHLORIDE 1 MG/ML
INJECTION, SOLUTION INTRAMUSCULAR; INTRAVENOUS; SUBCUTANEOUS
Status: COMPLETED
Start: 2024-06-28 | End: 2024-06-28

## 2024-06-28 RX ORDER — ACETAMINOPHEN 500 MG
500 TABLET ORAL EVERY 6 HOURS PRN
Status: DISCONTINUED | OUTPATIENT
Start: 2024-06-28 | End: 2024-06-29

## 2024-06-28 RX ORDER — BISACODYL 10 MG
10 SUPPOSITORY, RECTAL RECTAL ONCE AS NEEDED
Status: DISCONTINUED | OUTPATIENT
Start: 2024-06-28 | End: 2024-06-29

## 2024-06-28 RX ORDER — HYDROMORPHONE HYDROCHLORIDE 1 MG/ML
1 INJECTION, SOLUTION INTRAMUSCULAR; INTRAVENOUS; SUBCUTANEOUS ONCE
Status: COMPLETED | OUTPATIENT
Start: 2024-06-28 | End: 2024-06-28

## 2024-06-28 RX ORDER — ACETAMINOPHEN 500 MG
1000 TABLET ORAL EVERY 6 HOURS PRN
Status: DISCONTINUED | OUTPATIENT
Start: 2024-06-28 | End: 2024-06-29

## 2024-06-28 NOTE — PROGRESS NOTES
PT is  female admitted to [facility]. Patient represents with ctx Q5 min since 0000. Scheduled for an 0800 induction this morning.   PT is 40.1 week gestation intrauterine pregnancy.   History obtained, consents signed. Oriented to room, staff and plan of care.

## 2024-06-28 NOTE — PROGRESS NOTES
Pt admitted to mother baby unit  Pt oriented to room and unit procedures.  Please see flowsheet for details.

## 2024-06-28 NOTE — PLAN OF CARE
Problem: Patient/Family Goals  Goal: Patient/Family Long Term Goal  Description: Patient's Long Term Goal: Uncomplicated vaginal delivery     Interventions:   VS per protocol   I&O   Ice chips and sips as tolerated   EFM per protocol   Maintain IV as ordered   Antibiotics as needed per protocol   Informed consent   - See additional Care Plan goals for specific interventions  6/28/2024 1711 by Michelle Parada RN  Outcome: Progressing  6/28/2024 0919 by Michelle Parada RN  Outcome: Progressing  Goal: Patient/Family Short Term Goal  Description: Patient's Short Term Goal: pain management    Interventions:   - breathing techniques   -relaxation   -IV pain medication x1   - See additional Care Plan goals for specific interventions  6/28/2024 1711 by Michelle Parada RN  Outcome: Progressing  6/28/2024 0919 by Michelle Parada RN  Outcome: Progressing     Problem: POSTPARTUM  Goal: Long Term Goal:Experiences normal postpartum course  Description: INTERVENTIONS:  - Assess and monitor vital signs and lab values.  - Assess fundus and lochia.  - Provide ice/sitz baths for perineum discomfort.  - Monitor healing of incision/episiotomy/laceration, and assess for signs and symptoms of infection and hematoma.  - Assess bladder function and monitor for bladder distention.  - Provide/instruct/assist with pericare as needed.  - Provide VTE prophylaxis as needed.  - Monitor bowel function.  - Encourage ambulation and provide assistance as needed.  - Assess and monitor emotional status and provide social service/psych resources as needed.  - Utilize standard precautions and use personal protective equipment as indicated. Ensure aseptic care of all intravenous lines and invasive tubes/drains.  - Obtain immunization and exposure to communicable diseases history.  6/28/2024 1711 by Michelle Parada RN  Outcome: Progressing  6/28/2024 0919 by Michelle Parada RN  Outcome: Progressing  Goal: Optimize infant  feeding at the breast  Description: INTERVENTIONS:  - Initiate breast feeding within first hour after birth.   - Monitor effectiveness of current breast feeding efforts.  - Assess support systems available to mother/family.  - Identify cultural beliefs/practices regarding lactation, letdown techniques, maternal food preferences.  - Assess mother's knowledge and previous experience with breast feeding.  - Provide information as needed about early infant feeding cues (e.g., rooting, lip smacking, sucking fingers/hand) versus late cue of crying.  - Discuss/demonstrate breast feeding aids (e.g., infant sling, nursing footstool/pillows, and breast pumps).  - Encourage mother/other family members to express feelings/concerns, and actively listen.  - Educate father/SO about benefits of breast feeding and how to manage common lactation challenges.  - Recommend avoidance of specific medications or substances incompatible with breast feeding.  - Assess and monitor for signs of nipple pain/trauma.  - Instruct and provide assistance with proper latch.  - Review techniques for milk expression (breast pumping) and storage of breast milk. Provide pumping equipment/supplies, instructions and assistance, as needed.  - Encourage rooming-in and breast feeding on demand.  - Encourage skin-to-skin contact.  - Provide LC support as needed.  - Assess for and manage engorgement.  - Provide breast feeding education handouts and information on community breast feeding support.   6/28/2024 1711 by Michelle Parada, RN  Outcome: Progressing  6/28/2024 0919 by Michelle Parada, RN  Outcome: Progressing  Goal: Establishment of adequate milk supply with medication/procedure interruptions  Description: INTERVENTIONS:  - Review techniques for milk expression (breast pumping).   - Provide pumping equipment/supplies, instructions, and assistance until it is safe to breastfeed infant.  6/28/2024 1711 by Michelle Parada, RN  Outcome:  Progressing  2024 0919 by Michelle Parada, RN  Outcome: Progressing  Goal: Appropriate maternal -  bonding  Description: INTERVENTIONS:  - Assess caregiver- interactions.  - Assess caregiver's emotional status and coping mechanisms.  - Encourage caregiver to participate in  daily care.  - Assess support systems available to mother/family.  - Provide /case management support as needed.  2024 1711 by Michelle Parada, RN  Outcome: Progressing  2024 by Michelle Parada, RN  Outcome: Progressing

## 2024-06-28 NOTE — PLAN OF CARE
Problem: BIRTH - VAGINAL/ SECTION  Goal: Fetal and maternal status remain reassuring during the birth process  Description: INTERVENTIONS:  - Monitor vital signs  - Monitor fetal heart rate  - Monitor uterine activity  - Monitor labor progression (vaginal delivery)  - DVT prophylaxis (C/S delivery)  - Surgical antibiotic prophylaxis (C/S delivery)  Outcome: Completed     Problem: PAIN - ADULT  Goal: Verbalizes/displays adequate comfort level or patient's stated pain goal  Description: INTERVENTIONS:  - Encourage pt to monitor pain and request assistance  - Assess pain using appropriate pain scale  - Administer analgesics based on type and severity of pain and evaluate response  - Implement non-pharmacological measures as appropriate and evaluate response  - Consider cultural and social influences on pain and pain management  - Manage/alleviate anxiety  - Utilize distraction and/or relaxation techniques  - Monitor for opioid side effects  - Notify MD/LIP if interventions unsuccessful or patient reports new pain  - Anticipate increased pain with activity and pre-medicate as appropriate  Outcome: Completed     Problem: ANXIETY  Goal: Will report anxiety at manageable levels  Description: INTERVENTIONS:  - Administer medication as ordered  - Teach and rehearse alternative coping skills  - Provide emotional support with 1:1 interaction with staff  Outcome: Completed

## 2024-06-28 NOTE — PLAN OF CARE
Problem: Patient/Family Goals  Goal: Patient/Family Long Term Goal  Description: Patient's Long Term Goal: Uncomplicated vaginal delivery     Interventions:   VS per protocol   I&O   Ice chips and sips as tolerated   EFM per protocol   Maintain IV as ordered   Antibiotics as needed per protocol   Informed consent   - See additional Care Plan goals for specific interventions  Outcome: Progressing  Goal: Patient/Family Short Term Goal  Description: Patient's Short Term Goal: pain management    Interventions:   - breathing techniques   -relaxation   -IV pain medication x1   - See additional Care Plan goals for specific interventions  Outcome: Progressing     Problem: BIRTH - VAGINAL/ SECTION  Goal: Fetal and maternal status remain reassuring during the birth process  Description: INTERVENTIONS:  - Monitor vital signs  - Monitor fetal heart rate  - Monitor uterine activity  - Monitor labor progression (vaginal delivery)  - DVT prophylaxis (C/S delivery)  - Surgical antibiotic prophylaxis (C/S delivery)  Outcome: Progressing     Problem: PAIN - ADULT  Goal: Verbalizes/displays adequate comfort level or patient's stated pain goal  Description: INTERVENTIONS:  - Encourage pt to monitor pain and request assistance  - Assess pain using appropriate pain scale  - Administer analgesics based on type and severity of pain and evaluate response  - Implement non-pharmacological measures as appropriate and evaluate response  - Consider cultural and social influences on pain and pain management  - Manage/alleviate anxiety  - Utilize distraction and/or relaxation techniques  - Monitor for opioid side effects  - Notify MD/LIP if interventions unsuccessful or patient reports new pain  - Anticipate increased pain with activity and pre-medicate as appropriate  Outcome: Progressing     Problem: ANXIETY  Goal: Will report anxiety at manageable levels  Description: INTERVENTIONS:  - Administer medication as ordered  - Teach and  rehearse alternative coping skills  - Provide emotional support with 1:1 interaction with staff  Outcome: Progressing

## 2024-06-28 NOTE — H&P
[facility]  History & Physical    Bekah Agudelo Patient Status:  Inpatient    3/21/1990 MRN [id number]   Location [facility] LABOR & DELIVERY Attending Jonathan Andersen MD   Hosp Day # 0 PCP Catrachita Temple DO     SUBJECTIVE:    Bekah Agudelo is a 34 year old  female with EDC 24 at 40 and 1/7 weeks gestation who is being admitted for labor management.   She states she began donita last night.  Arrived on unit and was 5-6cm.  H/o scoliosis wit rods so cannot have epidural.  GBBS +    Obstetric History:   OB History    Para Term  AB Living   2 1 1 0 0 1   SAB IAB Ectopic Multiple Live Births   0 0 0 0 1      # Outcome Date GA Lbr Merrick/2nd Weight Sex Type Anes PTL Lv   2 Current            1 Term 22 40w0d 16:50 / 02:16 8 lb 2.9 oz (3.71 kg) M NORMAL SPONT Local N EVANGELIST      Complications: Meconium     Past Medical History:   Past Medical History:    Abdominal pain    Anxiety    Nausea & vomiting    Scoliosis     Past Social History:   Past Surgical History:   Procedure Laterality Date    Back surgery  age of 16    2 metal rods in back     Insert intrauterine device      Middle ear surgery proc unlisted  10/2021    Upper gi endoscopy; w/endoscopic u/s esophageal exam      [place] teeth removed  2009     Family History:   Family History   Problem Relation Age of Onset    Hypertension Mother     Other (scoliosis) Mother     Other (Born with one kidney ) Mother     Pancreatic Cancer Paternal Grandfather          from Pancreatic Cancer     Breast Cancer Other         maternal great grandfather     Heart Disorder Maternal Grandmother         Heart Palpitations    Heart Attack Maternal Grandfather 45    Eczema Brother      Social History:   Social History     Tobacco Use    Smoking status: Never    Smokeless tobacco: Never   Substance Use Topics    Alcohol use: No       Home Meds:   Medications Prior to Admission   Medication Sig Dispense Refill Last Dose     prenatal vitamin with DHA 27-0.8-228 MG Oral Cap Take 1 capsule by mouth daily.   6/27/2024 at 2000    famotidine 20 MG Oral Tab Inject 1 tablet (20 mg total) into the vein As Directed.   Past Month    citalopram 10 MG Oral Tab Take 1 tablet (10 mg total) by mouth daily. (Patient not taking: Reported on 10/12/2023) 30 tablet 0     ondansetron 4 MG Oral Tablet Dispersible 1-2 tabs ODT TID PRN nausea (Patient not taking: Reported on 10/12/2023) 12 tablet 0     ALPRAZolam 0.25 MG Oral Tab Take 1 tablet (0.25 mg total) by mouth 2 (two) times daily as needed. (Patient not taking: Reported on 10/12/2023) 20 tablet 0      Allergies: No Known Allergies    OBJECTIVE:    Temp:  [98.5 °F (36.9 °C)] 98.5 °F (36.9 °C)  Pulse:  [102] 102  Resp:  [18] 18  BP: (100)/(65) 100/65    Lungs:   clear to auscultation bilaterally   Heart:   regular rate and rhythm   Abdomen: soft, nontender, nondistended, no abnormal masses, no epigastric pain   Fetal Surveillance:  Category1 tracing   Fetal heart variability: moderate  Fetal Heart Rate accelerations: yes  Uterine contractions: regular, every 3-5 minutes      Cervix: dilation 10, effacement 100, and station +3     Lab Review:  A, Rh -, Rubella-immune, Hepatitis B surface antigen non-reactive, GBS positive  Lab Results   Component Value Date    WBC 10.5 06/28/2024    HGB 13.2 06/28/2024    HCT 37.7 06/28/2024    .0 06/28/2024          ASSESSMENT/PLAN:    40 and 1/7 weeks gestation.  2. Labor - anticipate vaginal delivery  3. Scolosis  4. GBBS +    Risks, benefits, alternatives and possible complications have been discussed in detail with the patient.  All questions answered and all appropriate consents have been signed        Jonathan Andersen MD  6/28/2024  6:30 AM

## 2024-06-28 NOTE — L&D DELIVERY NOTE
Jammie, Girl [[id number]]      Labor Events     labor?: No   steroids?: None  Antibiotics received during labor?: Yes  Antibiotics (enter # doses in comment): ampicillin  Rupture date/time: 2024     Rupture type: SROM  Fluid color: Meconium  Labor type: Spontaneous Onset of Labor  Augmentation: None  Intrapartum & labor complications: Meconium       Labor Event Times    Labor onset date/time: 2024 0000  Dilation complete date/time: 2024 0609       [place] Presentation    Presentation: Vertex       Operative Delivery    Operative Vaginal Delivery: No                Shoulder Dystocia    Shoulder Dystocia: No       Anesthesia    Method: Local              [place] Delivery      Delivery date/time:  24 06:18:00   Delivery type: Normal spontaneous vaginal delivery    Details:     Delivery location: delivery room       Delivery Providers    Delivering Clinician: Jonathan Andersen MD   Delivery personnel:  Provider Role   Hazel Desouza RN Baby Nurse   Bill Hidalgo RN Delivery Nurse             Cord    Vessels: 3 Vessels  Complications: None  Timed cord clamping: Yes  Time in sec: 30  Cord blood disposition: to lab  Gases sent?: No       Resuscitation    Method: None        Measurements      Weight: 3820 g 8 lb 6.8 oz Length: 52.1 cm     Head circum.: 34.5 cm Chest circum.: 33 cm      Abdominal circum.: 33 cm           Placenta    Date/time: 2024  Removal: Spontaneous  Appearance: Intact  Disposition: held for future pathology       Apgars    Living status: Living   Apgar Scoring Key:    0 1 2    Skin color Blue or pale Acrocyanotic Completely pink    Heart rate Absent <100 bpm >100 bpm    Reflex irritability No response Grimace Cry or active withdrawal    Muscle tone Limp Some flexion Active motion    Respiratory effort Absent Weak cry; hypoventilation Good, crying              1 Minute:  5 Minute:  10 Minute:  15 Minute:  20 Minute:      Skin  color: 0  1       Heart rate: 2  2       Reflex irritablity: 2  2       Muscle tone: 2  2       Respiratory effort: 2  2       Total: 8  9          Apgars assigned by: SHAW RITTER RN   disposition: with mother       Skin to Skin    Skin to skin initiated date/time: 2024 0630  Skin to skin with: Mother       Vaginal Count    Initial count RN: Bill Hidalgo RN  Initial count Tech: Medardo, Jeremy   Sponges   Sharps    Initial counts 11   0    Final counts 11   1    Final count RN: Bill Hidalgo RN  Final count MD: Jonathan Andersen MD       Lacerations    Episiotomy: None  Perineal lacerations: 1st Repaired?: Yes     Vaginal laceration?: No      Cervical laceration?: No    Clitoral laceration?: No    Quantitative blood loss (mL): 172                                                                    Vaginal Delivery Note          Bekah Agudelo Patient Status:  Inpatient    3/21/1990 MRN [id number]   [facility] LABOR & DELIVERY Attending Jonathan Andersen MD   Hosp Day # 0 PCP Catrachita Temple DO       Pre Op Dx:  IUP at 40 1/7weeks; labor    Post Op Dx: Same    Procedure: Normal Spontaneous Vaginal Delivery    Surgeon: Nathaly    Anesthesia:local    EBL: see QBL    Findings: 1) A viable female infant with Apgars of 8 and 9 weighing 8lbs 7 oz was delivered in the LEN position. 2) 3 vessel cord. 3)Normal appearing placenta spontaneously delivered. 4)first degree laceration    Procedure:  Patient is a 33y/o   who presented at 40 1/7 weeks gestation complaining of labor. Patient was admitted to labor and delivery.  Her labor progressed and upon complete dilation she had a strong urge to push and so was encouraged to do so.  Patient pushed for approximately 10mins at which time the head was .  As the head was delivered the legs were lowered and the perineum was supported to decrease the risk of tearing.  Infant was delivered in the LEN position.  The infants mouth and nose were  bulb suctioned.  The shoulders rotated easily and the anterior shoulder delivered easily followed by the posterior shoulder and remainder of the infant.  The infant was dried and suctioned.  The cord was doubly clamped and cut after 30secs.  The baby was placed on the mothers abdomen vigorous and crying.  The placenta spontaneously delivered intact shortly thereafter.    Examination of the cervix, vagina, and perineum demonstrated a first degree laceration.  The vaginal laceration was repaired using 3-0 vicryl rapide in a running locked fashion.  The skin was re-approximated using 3-0 vicryl rapide.   A recto-vaginal exam was normal and bleeding was minimal.  The patient was then moved to the supine position in stable condition.  Counts were correct.    Complications:  None    Jonathan Andersen MD  6/28/2024  6:33 AM

## 2024-06-28 NOTE — PROGRESS NOTES
Patient transferred to postpartum unit via wheel chair with infant in arms. Personal belongings accounted for.

## 2024-06-29 VITALS
DIASTOLIC BLOOD PRESSURE: 88 MMHG | BODY MASS INDEX: 25 KG/M2 | RESPIRATION RATE: 18 BRPM | SYSTOLIC BLOOD PRESSURE: 113 MMHG | WEIGHT: 149 LBS | HEART RATE: 78 BPM | TEMPERATURE: 98 F | OXYGEN SATURATION: 97 %

## 2024-06-29 NOTE — PROGRESS NOTES
OB Progress Note PPD#1    S: Feels well. Ambulating, eating. Pain controlled. Lochia mild. Breastfeeding.  Voiding without difficulty, + flatus,   O:   Blood pressure 113/88, pulse 78, temperature 97.9 °F (36.6 °C), temperature source Oral, resp. rate 18, weight 149 lb (67.6 kg), last menstrual period 2023, SpO2 97%, currently breastfeeding.  Gen: NAD, AAOx3  Breasts: soft, nontender, nonerythematous  Abdomen: soft, nontender, nondistended, fundus firm and nontender  Ext: trace edema      Lab Results  Lab Results   Component Value Date    WBC 14.3 2024    HGB 11.8 2024    HCT 35.1 2024    .0 2024     Recent Labs   Lab 24  0403 24  1710   RBC 4.30 3.90   HGB 13.2 11.8*   HCT 37.7 35.1   MCV 87.7 90.0   MCH 30.7 30.3   MCHC 35.0 33.6   RDW 12.9 13.1   NEPRELIM 7.44 11.74*   WBC 10.5 14.3*   .0 158.0                          A/P: PPD#1 s/p , doing well  1. Continue pain control with motrin  2. Breastfeeding   -- given encouragement and support   -- lactation consult PRN  3. Hgb stable at 11.8, no iron needed  4. DVT ppx: ambulating      Discharge home today

## 2024-06-29 NOTE — PLAN OF CARE
Problem: Patient/Family Goals  Goal: Patient/Family Long Term Goal  Description: Patient's Long Term Goal: Uncomplicated vaginal delivery     Interventions:   VS per protocol   I&O   Ice chips and sips as tolerated   EFM per protocol   Maintain IV as ordered   Antibiotics as needed per protocol   Informed consent   - See additional Care Plan goals for specific interventions  Outcome: Progressing  Goal: Patient/Family Short Term Goal  Description: Patient's Short Term Goal: pain management    Interventions:   - breathing techniques   -relaxation   -IV pain medication x1   - See additional Care Plan goals for specific interventions  Outcome: Progressing     Problem: POSTPARTUM  Goal: Long Term Goal:Experiences normal postpartum course  Description: INTERVENTIONS:  - Assess and monitor vital signs and lab values.  - Assess fundus and lochia.  - Provide ice/sitz baths for perineum discomfort.  - Monitor healing of incision/episiotomy/laceration, and assess for signs and symptoms of infection and hematoma.  - Assess bladder function and monitor for bladder distention.  - Provide/instruct/assist with pericare as needed.  - Provide VTE prophylaxis as needed.  - Monitor bowel function.  - Encourage ambulation and provide assistance as needed.  - Assess and monitor emotional status and provide social service/psych resources as needed.  - Utilize standard precautions and use personal protective equipment as indicated. Ensure aseptic care of all intravenous lines and invasive tubes/drains.  - Obtain immunization and exposure to communicable diseases history.  Outcome: Progressing  Goal: Optimize infant feeding at the breast  Description: INTERVENTIONS:  - Initiate breast feeding within first hour after birth.   - Monitor effectiveness of current breast feeding efforts.  - Assess support systems available to mother/family.  - Identify cultural beliefs/practices regarding lactation, letdown techniques, maternal food  preferences.  - Assess mother's knowledge and previous experience with breast feeding.  - Provide information as needed about early infant feeding cues (e.g., rooting, lip smacking, sucking fingers/hand) versus late cue of crying.  - Discuss/demonstrate breast feeding aids (e.g., infant sling, nursing footstool/pillows, and breast pumps).  - Encourage mother/other family members to express feelings/concerns, and actively listen.  - Educate father/SO about benefits of breast feeding and how to manage common lactation challenges.  - Recommend avoidance of specific medications or substances incompatible with breast feeding.  - Assess and monitor for signs of nipple pain/trauma.  - Instruct and provide assistance with proper latch.  - Review techniques for milk expression (breast pumping) and storage of breast milk. Provide pumping equipment/supplies, instructions and assistance, as needed.  - Encourage rooming-in and breast feeding on demand.  - Encourage skin-to-skin contact.  - Provide LC support as needed.  - Assess for and manage engorgement.  - Provide breast feeding education handouts and information on community breast feeding support.   Outcome: Progressing  Goal: Establishment of adequate milk supply with medication/procedure interruptions  Description: INTERVENTIONS:  - Review techniques for milk expression (breast pumping).   - Provide pumping equipment/supplies, instructions, and assistance until it is safe to breastfeed infant.  Outcome: Progressing  Goal: Appropriate maternal -  bonding  Description: INTERVENTIONS:  - Assess caregiver- interactions.  - Assess caregiver's emotional status and coping mechanisms.  - Encourage caregiver to participate in  daily care.  - Assess support systems available to mother/family.  - Provide /case management support as needed.  Outcome: Progressing

## 2024-06-29 NOTE — PLAN OF CARE
Problem: POSTPARTUM  Goal: Long Term Goal:Experiences normal postpartum course  Description: INTERVENTIONS:  - Assess and monitor vital signs and lab values.  - Assess fundus and lochia.  - Provide ice/sitz baths for perineum discomfort.  - Monitor healing of incision/episiotomy/laceration, and assess for signs and symptoms of infection and hematoma.  - Assess bladder function and monitor for bladder distention.  - Provide/instruct/assist with pericare as needed.  - Provide VTE prophylaxis as needed.  - Monitor bowel function.  - Encourage ambulation and provide assistance as needed.  - Assess and monitor emotional status and provide social service/psych resources as needed.  - Utilize standard precautions and use personal protective equipment as indicated. Ensure aseptic care of all intravenous lines and invasive tubes/drains.  - Obtain immunization and exposure to communicable diseases history.  6/29/2024 0844 by Mirella Pope, RN  Outcome: Completed  6/29/2024 0822 by Mirella Pope, RN  Outcome: Progressing  Goal: Optimize infant feeding at the breast  Description: INTERVENTIONS:  - Initiate breast feeding within first hour after birth.   - Monitor effectiveness of current breast feeding efforts.  - Assess support systems available to mother/family.  - Identify cultural beliefs/practices regarding lactation, letdown techniques, maternal food preferences.  - Assess mother's knowledge and previous experience with breast feeding.  - Provide information as needed about early infant feeding cues (e.g., rooting, lip smacking, sucking fingers/hand) versus late cue of crying.  - Discuss/demonstrate breast feeding aids (e.g., infant sling, nursing footstool/pillows, and breast pumps).  - Encourage mother/other family members to express feelings/concerns, and actively listen.  - Educate father/SO about benefits of breast feeding and how to manage common lactation challenges.  - Recommend avoidance of specific medications  or substances incompatible with breast feeding.  - Assess and monitor for signs of nipple pain/trauma.  - Instruct and provide assistance with proper latch.  - Review techniques for milk expression (breast pumping) and storage of breast milk. Provide pumping equipment/supplies, instructions and assistance, as needed.  - Encourage rooming-in and breast feeding on demand.  - Encourage skin-to-skin contact.  - Provide LC support as needed.  - Assess for and manage engorgement.  - Provide breast feeding education handouts and information on community breast feeding support.   2024 by Mirella Pope RN  Outcome: Completed  2024 by Mirella Pope RN  Outcome: Progressing  Goal: Establishment of adequate milk supply with medication/procedure interruptions  Description: INTERVENTIONS:  - Review techniques for milk expression (breast pumping).   - Provide pumping equipment/supplies, instructions, and assistance until it is safe to breastfeed infant.  2024 by Mirella Pope RN  Outcome: Completed  2024 by Mirella Pope RN  Outcome: Progressing  Goal: Appropriate maternal -  bonding  Description: INTERVENTIONS:  - Assess caregiver- interactions.  - Assess caregiver's emotional status and coping mechanisms.  - Encourage caregiver to participate in  daily care.  - Assess support systems available to mother/family.  - Provide /case management support as needed.  2024 by Mirella Pope RN  Outcome: Completed  2024 by Mirella Pope RN  Outcome: Progressing

## 2024-07-01 ENCOUNTER — TELEPHONE (OUTPATIENT)
Dept: OBGYN UNIT | Facility: HOSPITAL | Age: 34
End: 2024-07-01

## 2024-07-02 ENCOUNTER — TELEPHONE (OUTPATIENT)
Dept: OBGYN UNIT | Facility: HOSPITAL | Age: 34
End: 2024-07-02

## 2024-07-02 NOTE — PROGRESS NOTES
07/02/24 1146   Cradle Call Follow up   Cradle call follow up date 07/02/24   Follow up needed Completed   Hypertension or Preeclampsia during pregnancy or delivery No     Outgoing Cradle Call completed:    Mom reports that she and infant are doing well.  Baby has had a pediatrician F/U visit.   Reminded pt to schedule a postpartum follow up visit.   No complaints of PPD.   Reviewed basic self and infant care. Pt had a question on swollen area under arm pits; discussed possibly engorged area and to apply warm compresses then circular massage to area to assist in flow while pumping or breastfeeding. Pt has a LC appt 7/3  Encouraged to follow up w/ MD's w/ further question/concerns.

## 2024-07-03 ENCOUNTER — NURSE ONLY (OUTPATIENT)
Dept: LACTATION | Facility: HOSPITAL | Age: 34
End: 2024-07-03
Attending: OBSTETRICS & GYNECOLOGY
Payer: COMMERCIAL

## 2024-07-03 DIAGNOSIS — O92.79 POOR LATCH ON, POSTPARTUM (HCC): Primary | ICD-10-CM

## 2024-07-03 PROCEDURE — 99213 OFFICE O/P EST LOW 20 MIN: CPT

## 2024-07-11 ENCOUNTER — NURSE ONLY (OUTPATIENT)
Dept: LACTATION | Facility: HOSPITAL | Age: 34
End: 2024-07-11
Attending: OBSTETRICS & GYNECOLOGY
Payer: COMMERCIAL

## 2024-07-11 DIAGNOSIS — O92.79 POOR LATCH ON, POSTPARTUM (HCC): Primary | ICD-10-CM

## 2024-07-11 PROCEDURE — 99213 OFFICE O/P EST LOW 20 MIN: CPT

## 2024-07-19 ENCOUNTER — NURSE ONLY (OUTPATIENT)
Dept: LACTATION | Facility: HOSPITAL | Age: 34
End: 2024-07-19
Attending: OBSTETRICS & GYNECOLOGY
Payer: COMMERCIAL

## 2024-07-19 ENCOUNTER — LACTATION ENCOUNTER (OUTPATIENT)
Dept: LACTATION | Facility: HOSPITAL | Age: 34
End: 2024-07-19

## 2024-07-19 PROCEDURE — 99212 OFFICE O/P EST SF 10 MIN: CPT

## 2024-07-19 NOTE — LACTATION NOTE
LACTATION NOTE - MOTHER      Evaluation Type: Outpatient Follow Up    Problems identified  Problems identified: Knowledge deficit;Recent antibiotic use;Milk supply WNL;Flat nipple(s)  Problems Identified Other: Bekah presented to the [facility] with her  and 3 wk old infant for a follow up Lactation Consult. Her infant is gaining weight (7oz in the past 8 days) when infant is being given supplements. Bekah  continues to having difficulty BF and needs a NS to latch to Bekah's R side. Bekah is having pain with BF and is concerned that infant is having difficulty with bottle feeding.    Maternal history  Maternal history: Anxiety;AMA  Other/comment: Scoliosis.  Has rods.  GBBS+.  HX of increased heart rate.    Breastfeeding goal  Breastfeeding goal: To maintain breast milk feeding per patient goal    Maternal Assessment  Bilateral Breasts: Soft;Symmetrical  Bilateral Nipples:  (Breast milk easily expressed)  Right Nipple: Flat  Left Nipple: Everted;Sore  Prior breastfeeding experience (comment below): Multip;Unsuccessful  Prior BF experience: comment: Tried.  Baby would not latch.   Milk intolerance.  Child is now 1 years old.  Breastfeeding Assistance: Breast exam provided with permission;Hand expression provided with permission;Breastfeeding assistance provided with permission    Pain assessment  Pain, additional: Pain location  Pain Location: Nipples  Treatment of Sore Nipples: Expressed breast milk;Hydrogel dressings as directed    Guidelines for use of:  Equipment: Hydrogel dressings  Breast pump type: Spectra  Current use of pump:: 4 times  Reported pumping volumes (ml):   Post-feed pumped volume: 3 1/2 oz  Other (comment): Assisted with BF, infant continues to have a disorganized suck. Pt c/o chewing sensation when infant is at the breast directly and is having nipple soreness. CORY reviewed care of sore nipples, hydrogel drsg was given and instructed on use. LC called pediatrician to  discuss recommendation for infant to be evaluated by speech/myofunctional therapist for suck training and assistance with bottle feeding supplements. Enc to f/u with LC prn.

## 2024-07-19 NOTE — LACTATION NOTE
Can follow up here or pain management   This note was copied from a baby's chart.  LACTATION NOTE - INFANT    Evaluation Type  Evaluation Type: Outpatient Follow Up    Problems & Assessment  Problems Diagnosed or Identified: Infant feeding problem;Latch difficulty;Disorganized suck  Problems: comment/detail: Yessica and her parents presented to the [facility] for a follow up Lactation Consult at 3 weeks of age. Yessica weighed 8 lbs 6.5 oz at birth and weighed 8 lbs 13.5 oz today. She is gaining weight (7oz in the past 8 days). Yessica is having difficulty BF and needs a NS to latch to mother's R side, mother  c/o pain when infant latches directly at her breast on her L side and feels as if infant is chewing instead of sucking. Parents have been worried infant is not gaining enough weight so they have been offering bottles of EBM for infant. Parents have been having difficulty bottle feeding infant, reporting bottle feeding is taking a long time, infant is gagging and baby seems frustrated with bottle feeds and then tires out easily. At night time parents feel upset because they are not sure how to help their baby. CORY checked infant's sucking with a gloved finger and noted infant has a biting motion and an uncoordinated suck. CORY is recommending that infant be seen by a speech/myofunctional therapist for a sucking evaluation and assistance with sucking exercises to help improve feedings. CORY contacted Dr. Saeed's office to discuss plan. A message was left with the office nurse to have pediatrician call back.  Infant Assessment: Anterior fontanel soft and flat;Abdomen soft, non-distended;Good skin turgor;Hunger cues present;Oral mucous membranes moist;Skin color: pink or appropriate for ethnicity (white coating on tongue, sucking blister on upper lip, thick labial frenulum with notch on upper gumline, head slightly asymmetrical, tends to turn head to the right side)  Muscle tone: Appropriate for GA    Feeding Assessment  Summary Current Feeding:  Breastfeeding with breast milk supplement  Last 24 hour feeding summary: BF 4, Bottles 6 (2.5-3 oz)  Breastfeeding Assessment: Assisted with breastfeeding w/mother's permission;Pulling on nipple;Sustained nutrititive latch w/audible swallows;Sustained nutritive latch using nipple shield;Sleepy infant, quickly pacifies  Breastfeeding lasted # of minutes: 16  Breastfeeding Positions: left breast;right breast;cross cradle;cradle  Latch: Grasps breast, tongue down, lips flanged, rhythmic sucking  Audible Sucks/Swallows: Spontaneous and intermittent (24 hours old)  Type of Nipple: Everted (after stimulation)  Comfort (Breast/Nipple): Filling, red/small blisters/bruises, mild/mod discomfort  Hold (Positioning): Full assist, teach one side, mother does other, staff holds  LATCH Score: 8  Other (comment): Observed mother latch infant to the L breast. Infant able to achieve a deep latch, but intermittently breaks her seal and comes on and off the breast making a smacking noise. Mother describes a chewing sensation that is painful while infant was breastfeeding directly on her R breast. Infant transferred 88 ml of milk in 10 mins. Switched to the R breast and attempted a direct latch, however infant takes a few sucks, pops off. Attempted the direct latch several times until infant started fussing and then placed the 16 mm NS over mother's nipple to assist with latch. Infant was able to latch using the NS, mother denied nipple soreness when using the NS. Infant tired easily on the second breast, R side. Infant transferred 8 ml in 6 mins. Infant settled without a supplement. Discussed precautions of using the nipple shield and the importance of using a breast pump on her R breast when using the NS to protect her milk supply and provide a supplement of EBM for infant. Discussed feeding plan for feeding infant on demand and waking by 2-3 hrs for feedings, watching output closely, increasing supplements if infant's wet diapers are  not adequate. Bottle feeding prn. Discussed paced bottle feeding techniques. Parents feel infant feeds better with the [employer] bottle. Parents reported infant gags when bottle feeding with the Dr. Brown bottle. LC recommended  trying different nipple sizes with the Dr. Brown bottle to see if infant would take the smaller bottle nipple better. Enc to contact pediatrician with feeding concerns.    Output  # Voids in 24 hours: 8  # Stools in 24 hours: 2    Pre/Post Weights  Pre-Weight Right Breast (g): 4098  Post-Weight Right Breast (g): 4108  ml of milk, RT Brst: 10  Pre-Weight Left Breast (g): 4010  Post-Weight Left Breast (g): 4098  ml of milk, LT Brst: 88  ml of milk, total: 98  Supplement total, ml: 0  Feeding total ml: 98    Equipment used  Equipment used: Nipple Shield  Nipple shield size: 16 mm

## 2024-07-19 NOTE — PATIENT INSTRUCTIONS
Yessica weighed 8 lbs 13.5 oz today 3 weeks old  She  and transferred 88 ml of milk from the Left breast and 10 ml of milk from the Right breast. She became fatigued after breastfeeding and was not given a supplement    I am recommending that Yessica be seen by a speech/myofunctional therapist for a sucking evaluation and assistance with bottle feeding  Dr. Saeed approved that Yessica could be seen by a speech therapist for an evaluation    Call the [facility] at [phone] to schedule a follow up appointment after seeing the speech therapist if needed.  Call with breastfeeding questions as needed   Call your pediatrician with any feeding difficulties, inadequate wet diapers/stools or concerns.  Remember when using a Nipple Shield with Breastfeeding it is recommended your baby have weekly weight checks    Breastfeeding suggestions when supplements are needed     Kangaroo mother care: Snuggle your baby between your breasts in just a diaper and covered with a blanket. Helps to wake a sleepy baby and increases your milk supply.      Massage your breasts before nursing or pumping.     Breastfeed with hunger cues, offer gentle breastfeeding attempts when you are able to, keep breastfeeding attempt up to 10-20 mins and supplement with a bottle of Expressed breast milk and or formula.     Positioning:   Your hand at neck/shoulders, not the back of head.   Line chin with the bottom of your areola     Latching on:  Express drops of milk onto your baby's lips to encourage licking.  Point your nipple to baby's nose  Stroke nipple lightly down center of lips  Wait for wide mouth with tongue cupped at bottom of mouth.  Chin should be deep into breast, with some room between nose and the breast.   If needed, gently draw chin down lower to deepen latch.     Is baby taking enough breastmilk?  Swallowing with most sucks (every 1-3 sucks)   Compressing the breast when your baby sucks can increase milk  flow.  At least 6-8 wet diapers and at least 3-4 soft, yellow seedy stools every 24 hours. Use breastfeeding journal.  Weight gain of at least 4-7 ounces per week     Supplementation:   If not meeting these guidelines for adequate breastfeeding, feed 1-2 oz or more expressed milk or formula with a wide based, slow flow nipple.     Paced bottle feeding using a slow flow nipple:   Hold your baby in an upright position, supporting the hand and neck with your hand, rather than in the crook of your arm.   Let you baby \"latch on\" to bottle: stroke nipple down from top lip to bottom, licking is good, wait for wide mouth, tongue cupped at bottom of mouth.  Tip the bottle up just far enough that there is not air in the nipple.  Pausing mimics breastfeeding and discourages \"guzzling\" the feeding, allowing infant to take at least 15 minutes to drink the breastmilk or formula.      Milk Supply:   Massage your breasts before nursing and pumping, skin to skin contact with baby as much as possible.   Pump both breasts for 20 minutes every 2-3 hours after nursing. (at least 6-8x/24 hours).   If milk supply is not responding to above measures within the week:  Discuss thyroid check with OB physician      Prevent plugged ducts and mastitis  Watch for signs of breast infection (mastitis) - painful breast, reddened area, fever, chills or flu-like symptoms - call your OB doctor at once if this occurs.       Weight check sooner if wet or stool diapers decrease. Have weight checked again within 1-3 days of decreasing/stopping supplements.      For additional information: La Leche [employer] website  www.llli.org     Call the [facility] at [phone] with breastfeeding questions as needed.  Keep your pediatrician appointment as scheduled.  Call your pediatrician with any feeding difficulties or concerns.    If you choose to use a Nipple Shield with your Breastfeeding Attempts, please follow the guidelines for weekly weight  checks and pumping to protect your milk supply    Guidelines for Using a Nipple Shield    Refer to the ’s instructions. These are additional suggestions only.  This thin silicone nipple shield (size 16 mm ) has been recommended to assist your baby to latch on to the breast or for protection of your nipples while your baby learns to breastfeed correctly.  Use of the shield is considered temporary, allowing you to begin breastfeeding your baby. Some infants have  successfully using the shield for longer periods, however, checking your infant’s weight regularly is recommended to assure adequate growth while using the nipple shield.    Cautions regarding nipple shield use:  The use of a nipple shield may decrease your milk supply and could decrease the amount of milk your baby receives.  Careful follow-up, including weight checks, with your lactation consultant and baby’s health care provider is important.   Do not use a different nipple shield.     Before feeding your baby:  Apply warm, moist heat to your breasts for a few minutes to increase milk flow.  Rinse the shield in warm water to make it          softer and easier to adhere to the breast.  Apply nipple shield correctly:               Hold the shield by the rim, turn it inside-out intermediate. Place the shield, centered over the                 nipple and flip the shield right side out, drawing your nipple and areola into the                shield as much as possible.  Massage breasts and if possible, hand express  some breastmilk into the nipple shield.     Latching your baby on to the breast:  Stroke your baby’s lower lip with the nipple of the shield. Wait for your baby to open wide like a “yawn,” with the tongue down and out over the lower lip. Bring baby’s mouth directly over the shield. It may take a few attempts before your baby settles into a rhythmical suckling pattern.  After several minutes of regular swallowing at the breast, you may  want to try to reattach your baby to your breast without the shield.  When your baby’s swallowing slows on the first side, repeat this process on the other breast.   If needed, trickle drops of your expressed milk or formula onto the nipple to encourage your baby to latch on. If your baby becomes upset or has not latched on within 20 minutes, stop and feed your baby using another method.    Signs of correct, effective suckling include:  Your baby swallows with nearly every suck (this is called nutritive suckling: swallowing every 1-3 sucks)  Your baby sustains nutritive suck and swallow pattern for at least 15-30 minutes, offer both breasts at each feeding.  Your nipples are not painful during the feeding.  Your baby is content after most feedings.  Your baby has adequate diapers (at least 6-8 wet and 3-4 loose, yellow stools every 24 hours by the 4th day of life) AND gains at least 4-8 ounces per week (one-half to one ounce per day). Use the breastfeeding journal to record your baby’s feedings and diapers.    Is a supplement needed?  If your baby does not latch on, or swallows less than 15-30 minutes at a feeding - swallowing after most sucks (at least every 1-3 sucks), feed your baby additional expressed breastmilk or formula by  wide base slow flow bottle with 1 to 2 +oz to satisfaction.                 After feeding your baby:  Pump your breasts for 10-15 minutes using a hospital grade rental breast pump, after most daytime feedings. This may help maintain adequate milk supply while using the shield. If your baby is not latching on yet, pump at least 8-12 times each 24 hours.  Wash the nipple shield in hot soapy water, rinse and air dry. The shield may be boiled.     Follow-up is VERY important!  Let your baby’s health care provider know immediately if it is difficult for you to feed your baby or if the number of wet or stool diapers is inadequate.   Follow-up visits with your lactation consultant and baby’s  health care provider are necessary when using the shield.   Your baby’s weight should be checked 1-2 times each week while using a nipple shield.

## 2024-10-13 NOTE — PROGRESS NOTES
Discharged to home per ambulatory in stable condition with written and verbal instructions. Patient not in active labor.  Patient verbalizes understanding of information given.   
ER visit/Event Note
Patient back from walking, states she feels some UC when up moving.  Would be comfortable to go home if no cervical change.  
Patient up to walk x2 hours and then to be re-evaluated for labor progress.   
Pt is a 34 year old female admitted to TRG[facility]-A.     Chief Complaint   Patient presents with    R/o Labor     Patient presents with irregular UC since the night.  States less frequent now.  Denies bleeding or LOF.  + FM      Pt is  39w6d intra-uterine pregnancy.  History obtained, consents signed. Oriented to room, staff, and plan of care.  
FDNY

## 2024-10-14 ENCOUNTER — TELEPHONE (OUTPATIENT)
Dept: INTERNAL MEDICINE CLINIC | Facility: CLINIC | Age: 34
End: 2024-10-14

## 2024-10-14 DIAGNOSIS — Z13.220 SCREENING FOR LIPID DISORDERS: ICD-10-CM

## 2024-10-14 DIAGNOSIS — Z13.228 SCREENING FOR METABOLIC DISORDER: ICD-10-CM

## 2024-10-14 DIAGNOSIS — Z00.00 ROUTINE GENERAL MEDICAL EXAMINATION AT A HEALTH CARE FACILITY: Primary | ICD-10-CM

## 2024-10-14 DIAGNOSIS — Z13.29 SCREENING FOR THYROID DISORDER: ICD-10-CM

## 2024-10-14 NOTE — TELEPHONE ENCOUNTER
Patient called request labs prior to their annual physical.  Annual physical scheduled for 10/24/24.   Please order labs. Patient preferred lab is [facility].  Patient informed request was sent to clinical team.  Patient informed to fast for labs.  No callback required.

## 2024-10-18 PROBLEM — Z34.90 PREGNANCY (HCC): Status: RESOLVED | Noted: 2022-07-13 | Resolved: 2024-10-18

## 2024-10-24 ENCOUNTER — OFFICE VISIT (OUTPATIENT)
Dept: INTERNAL MEDICINE CLINIC | Facility: CLINIC | Age: 34
End: 2024-10-24
Payer: COMMERCIAL

## 2024-10-24 VITALS
OXYGEN SATURATION: 97 % | BODY MASS INDEX: 17.83 KG/M2 | WEIGHT: 107 LBS | RESPIRATION RATE: 16 BRPM | HEIGHT: 65 IN | DIASTOLIC BLOOD PRESSURE: 60 MMHG | TEMPERATURE: 97 F | SYSTOLIC BLOOD PRESSURE: 90 MMHG | HEART RATE: 52 BPM

## 2024-10-24 DIAGNOSIS — Z01.84 IMMUNITY STATUS TESTING: ICD-10-CM

## 2024-10-24 DIAGNOSIS — Z00.00 ROUTINE GENERAL MEDICAL EXAMINATION AT A HEALTH CARE FACILITY: Primary | ICD-10-CM

## 2024-10-24 DIAGNOSIS — R53.83 OTHER FATIGUE: ICD-10-CM

## 2024-10-24 DIAGNOSIS — Z11.1 SCREENING-PULMONARY TB: ICD-10-CM

## 2024-10-24 NOTE — PROGRESS NOTES
Addended by: OMERO FREEMAN on: 3/5/2020 10:48 AM     Modules accepted: Orders     Bekah Agudelo is a 34 year old female.    Chief Complaint   Patient presents with    Physical     Rm 13 SS. Physical for job       Patient complains of:    Needs form completed for work. She is working at a .     She has 2 children. Youngest is 4 months old. She is feeling good. Denies depression and anxiety.  Some fatigue issues.     Medications Ordered Prior to Encounter[1]     Past Medical History:    Abdominal pain    Anxiety    Nausea & vomiting    Scoliosis     Past Surgical History:   Procedure Laterality Date    Back surgery  age of 16    2 metal rods in back     Insert intrauterine device      Middle ear surgery proc unlisted  10/2021    Upper gi endoscopy; w/endoscopic u/s esophageal exam      [place] teeth removed  2009     Family History   Problem Relation Age of Onset    Hypertension Mother     Other (scoliosis) Mother     Other (Born with one kidney ) Mother     Pancreatic Cancer Paternal Grandfather          from Pancreatic Cancer     Breast Cancer Other         maternal great grandfather     Heart Disorder Maternal Grandmother         Heart Palpitations    Heart Attack Maternal Grandfather 45    Eczema Brother        Social History     Socioeconomic History    Marital status:    Tobacco Use    Smoking status: Never    Smokeless tobacco: Never   Vaping Use    Vaping status: Never Used   Substance and Sexual Activity    Alcohol use: No    Drug use: No    Sexual activity: Yes     Partners: Male     Birth control/protection: I.U.D., Mirena   Other Topics Concern    Caffeine Concern No     Comment: 2x weekly     Exercise No    Seat Belt Yes         HEALTH MAINTENANCE:     Health Maintenance Due   Topic Date Due    Annual Physical  Never done    COVID-19 Vaccine (3 - 2023-24 season) 2024    Influenza Vaccine (1) Never done       -  2024  -      REVIEW OF SYSTEMS:   GENERAL: feels well otherwise  SKIN: denies any unusual skin lesions  EYES:denies blurred vision or  double vision  HEENT: denies nasal congestion, sinus pain or ST  LUNGS: denies shortness of breath with exertion  CARDIOVASCULAR: denies chest pain on exertion  GI: denies abdominal pain,denies heartburn  : denies dysuria, vaginal discharge or itching  MUSCULOSKELETAL: denies back pain  NEURO: denies headaches  PSYCHE: denies depression or anxiety  HEMATOLOGIC: denies hx of anemia  ENDOCRINE: denies thyroid history  ALL/ASTHMA: denies hx of allergy or asthma    EXAM:   BP 90/60   Pulse 52   Temp 96.6 °F (35.9 °C) (Temporal)   Resp 16   Ht 5' 5\" (1.651 m)   Wt 107 lb (48.5 kg)   LMP 09/01/2023 (Approximate)   SpO2 97%   Breastfeeding Yes   BMI 17.81 kg/m²   GENERAL: well developed, well nourished,in no apparent distress  SKIN: no rashes,no suspicious lesions  HEENT: atraumatic, normocephalic,ears and throat are clear  EYES:PERRLA, EOMI, conjunctiva are clear  NECK: supple,no adenopathy  CHEST: no chest tenderness  BREAST: per gyne   LUNGS: clear to auscultation  CARDIO: RRR without murmur  GI: good BS's,no masses, HSM or tenderness  GYNE/: per gyne   MUSCULOSKELETAL: back is not tender,FROM of the back  EXTREMITIES: no cyanosis, clubbing or edema  NEURO: Oriented times three,cranial nerves are intact,motor and sensory are grossly intact    ASSESSMENT AND PLAN:   1. Routine general medical examination at a health care facility  Check labs   Recommend flu vaccine - she declined today but will complete at a later date   - Comp Metabolic Panel (14) [E]; Future  - Lipid Panel [E]; Future  - CBC W Differential W Platelet [E]; Future  - TSH W Reflex To Free T4 [E]; Future    2. Screening-pulmonary TB  - Quantiferon TB Plus; Future    3. Immunity status testing  - MMR Panel([facility]); Future    4. Other fatigue  Check labs   - Iron And Tibc [E]; Future  - Ferritin [E]; Future  - Vitamin D [E]; Future      Pt' s weight is   Wt Readings from Last 6 Encounters:   10/24/24 107 lb (48.5 kg)   06/28/24  149 lb (67.6 kg)   06/26/24 149 lb (67.6 kg)   01/05/24 107 lb (48.5 kg)   10/12/23 104 lb (47.2 kg)   06/17/23 101 lb (45.8 kg)   , Body mass index is 17.81 kg/m². recommended low fat diet and aerobic exercise 30 minutes three times weekly.     Encounter Diagnoses   Name Primary?    Routine general medical examination at a health care facility Yes    Screening-pulmonary TB     Immunity status testing     Other fatigue        Orders Placed This Encounter   Procedures    MMR Panel([facility] Immun[facility] Panel)    Quantiferon TB Plus    Comp Metabolic Panel (14) [E]    Lipid Panel [E]    CBC W Differential W Platelet [E]    TSH W Reflex To Free T4 [E]    Iron And Tibc [E]    Ferritin [E]    Vitamin D [E]       Meds and Refills:  Requested Prescriptions      No prescriptions requested or ordered in this encounter       Imaging and Referrals:  None    The patient indicates understanding of these issues and agrees to the plan.  The patient is asked to return for CPX in one year.    Olivia Mak PA-C             [1]   Current Outpatient Medications on File Prior to Visit   Medication Sig Dispense Refill    prenatal vitamin with DHA 27-0.8-228 MG Oral Cap Take 1 capsule by mouth daily.       No current facility-administered medications on file prior to visit.

## 2024-10-26 ENCOUNTER — LAB ENCOUNTER (OUTPATIENT)
Dept: LAB | Age: 34
End: 2024-10-26
Attending: PHYSICIAN ASSISTANT
Payer: COMMERCIAL

## 2024-10-26 DIAGNOSIS — Z13.220 SCREENING FOR LIPID DISORDERS: ICD-10-CM

## 2024-10-26 DIAGNOSIS — Z13.29 SCREENING FOR THYROID DISORDER: ICD-10-CM

## 2024-10-26 DIAGNOSIS — Z13.228 SCREENING FOR METABOLIC DISORDER: ICD-10-CM

## 2024-10-26 DIAGNOSIS — Z11.1 SCREENING-PULMONARY TB: ICD-10-CM

## 2024-10-26 DIAGNOSIS — R53.83 OTHER FATIGUE: ICD-10-CM

## 2024-10-26 DIAGNOSIS — Z00.00 ROUTINE GENERAL MEDICAL EXAMINATION AT A HEALTH CARE FACILITY: ICD-10-CM

## 2024-10-26 DIAGNOSIS — Z01.84 IMMUNITY STATUS TESTING: ICD-10-CM

## 2024-10-26 LAB
ALBUMIN SERPL-MCNC: 4.2 G/DL (ref 3.2–4.8)
ALBUMIN/GLOB SERPL: 1.7 {RATIO} (ref 1–2)
ALP LIVER SERPL-CCNC: 87 U/L
ALT SERPL-CCNC: 13 U/L
ANION GAP SERPL CALC-SCNC: 7 MMOL/L (ref 0–18)
AST SERPL-CCNC: 19 U/L (ref ?–34)
BASOPHILS # BLD AUTO: 0.04 X10(3) UL (ref 0–0.2)
BASOPHILS NFR BLD AUTO: 0.5 %
BILIRUB SERPL-MCNC: 0.4 MG/DL (ref 0.3–1.2)
BUN BLD-MCNC: 9 MG/DL (ref 9–23)
CALCIUM BLD-MCNC: 9.6 MG/DL (ref 8.7–10.4)
CHLORIDE SERPL-SCNC: 113 MMOL/L (ref 98–112)
CHOLEST SERPL-MCNC: 119 MG/DL (ref ?–200)
CO2 SERPL-SCNC: 23 MMOL/L (ref 21–32)
CREAT BLD-MCNC: 0.82 MG/DL
DEPRECATED HBV CORE AB SER IA-ACNC: 42 NG/ML
EGFRCR SERPLBLD CKD-EPI 2021: 96 ML/MIN/1.73M2 (ref 60–?)
EOSINOPHIL # BLD AUTO: 0.14 X10(3) UL (ref 0–0.7)
EOSINOPHIL NFR BLD AUTO: 1.8 %
ERYTHROCYTE [DISTWIDTH] IN BLOOD BY AUTOMATED COUNT: 13 %
FASTING PATIENT LIPID ANSWER: YES
FASTING STATUS PATIENT QL REPORTED: YES
GLOBULIN PLAS-MCNC: 2.5 G/DL (ref 2–3.5)
GLUCOSE BLD-MCNC: 76 MG/DL (ref 70–99)
HCT VFR BLD AUTO: 38 %
HDLC SERPL-MCNC: 58 MG/DL (ref 40–59)
HGB BLD-MCNC: 12.5 G/DL
IMM GRANULOCYTES # BLD AUTO: 0.02 X10(3) UL (ref 0–1)
IMM GRANULOCYTES NFR BLD: 0.3 %
IRON SATN MFR SERPL: 15 %
IRON SERPL-MCNC: 38 UG/DL
LDLC SERPL CALC-MCNC: 51 MG/DL (ref ?–100)
LYMPHOCYTES # BLD AUTO: 2.61 X10(3) UL (ref 1–4)
LYMPHOCYTES NFR BLD AUTO: 33.4 %
MCH RBC QN AUTO: 29.6 PG (ref 26–34)
MCHC RBC AUTO-ENTMCNC: 32.9 G/DL (ref 31–37)
MCV RBC AUTO: 89.8 FL
MONOCYTES # BLD AUTO: 0.56 X10(3) UL (ref 0.1–1)
MONOCYTES NFR BLD AUTO: 7.2 %
NEUTROPHILS # BLD AUTO: 4.44 X10 (3) UL (ref 1.5–7.7)
NEUTROPHILS # BLD AUTO: 4.44 X10(3) UL (ref 1.5–7.7)
NEUTROPHILS NFR BLD AUTO: 56.8 %
NONHDLC SERPL-MCNC: 61 MG/DL (ref ?–130)
OSMOLALITY SERPL CALC.SUM OF ELEC: 293 MOSM/KG (ref 275–295)
PLATELET # BLD AUTO: 241 10(3)UL (ref 150–450)
POTASSIUM SERPL-SCNC: 3.8 MMOL/L (ref 3.5–5.1)
PROT SERPL-MCNC: 6.7 G/DL (ref 5.7–8.2)
RBC # BLD AUTO: 4.23 X10(6)UL
RUBV IGG SER QL: POSITIVE
RUBV IGG SER-ACNC: 34 IU/ML (ref 10–?)
SODIUM SERPL-SCNC: 143 MMOL/L (ref 136–145)
TOTAL IRON BINDING CAPACITY: 259 UG/DL (ref 250–425)
TRANSFERRIN SERPL-MCNC: 202 MG/DL (ref 250–380)
TRIGL SERPL-MCNC: 39 MG/DL (ref 30–149)
TSI SER-ACNC: 1.3 MIU/ML (ref 0.55–4.78)
VIT D+METAB SERPL-MCNC: 38.7 NG/ML (ref 30–100)
VLDLC SERPL CALC-MCNC: 6 MG/DL (ref 0–30)
WBC # BLD AUTO: 7.8 X10(3) UL (ref 4–11)

## 2024-10-26 PROCEDURE — 86480 TB TEST CELL IMMUN MEASURE: CPT

## 2024-10-26 PROCEDURE — 83540 ASSAY OF IRON: CPT

## 2024-10-26 PROCEDURE — 86735 MUMPS ANTIBODY: CPT

## 2024-10-26 PROCEDURE — 86765 RUBEOLA ANTIBODY: CPT

## 2024-10-26 PROCEDURE — 86762 RUBELLA ANTIBODY: CPT

## 2024-10-26 PROCEDURE — 83550 IRON BINDING TEST: CPT

## 2024-10-26 PROCEDURE — 80053 COMPREHEN METABOLIC PANEL: CPT

## 2024-10-26 PROCEDURE — 85025 COMPLETE CBC W/AUTO DIFF WBC: CPT

## 2024-10-26 PROCEDURE — 82306 VITAMIN D 25 HYDROXY: CPT

## 2024-10-26 PROCEDURE — 80061 LIPID PANEL: CPT

## 2024-10-26 PROCEDURE — 82728 ASSAY OF FERRITIN: CPT

## 2024-10-26 PROCEDURE — 84443 ASSAY THYROID STIM HORMONE: CPT

## 2024-10-28 LAB
MEV IGG SER-ACNC: 9.6 AU/ML (ref 16.5–?)
MUV IGG SER IA-ACNC: 28.7 AU/ML (ref 11–?)

## 2024-10-29 LAB
M TB IFN-G CD4+ T-CELLS BLD-ACNC: 0.01 IU/ML
M TB TUBERC IFN-G BLD QL: NEGATIVE
M TB TUBERC IGNF/MITOGEN IGNF CONTROL: >10 IU/ML
QFT TB1 AG MINUS NIL: 0.02 IU/ML
QFT TB2 AG MINUS NIL: 0 IU/ML

## 2024-11-21 ENCOUNTER — TELEPHONE (OUTPATIENT)
Dept: INTERNAL MEDICINE CLINIC | Facility: CLINIC | Age: 34
End: 2024-11-21

## 2024-11-21 NOTE — TELEPHONE ENCOUNTER
Medical Report on an Adult  Facility  Form completed. Left voicemail that form was ready for . Original copy left at  copy placed in bin for scanning.

## 2025-04-25 ENCOUNTER — LAB ENCOUNTER (OUTPATIENT)
Dept: LAB | Age: 35
End: 2025-04-25
Attending: PHYSICIAN ASSISTANT
Payer: COMMERCIAL

## 2025-04-25 ENCOUNTER — TELEMEDICINE (OUTPATIENT)
Dept: INTERNAL MEDICINE CLINIC | Facility: CLINIC | Age: 35
End: 2025-04-25
Payer: COMMERCIAL

## 2025-04-25 DIAGNOSIS — R19.7 DIARRHEA, UNSPECIFIED TYPE: ICD-10-CM

## 2025-04-25 DIAGNOSIS — E61.1 IRON DEFICIENCY: ICD-10-CM

## 2025-04-25 DIAGNOSIS — R19.7 DIARRHEA, UNSPECIFIED TYPE: Primary | ICD-10-CM

## 2025-04-25 LAB
ALBUMIN SERPL-MCNC: 4.6 G/DL (ref 3.2–4.8)
ALBUMIN/GLOB SERPL: 1.9 {RATIO} (ref 1–2)
ALP LIVER SERPL-CCNC: 74 U/L (ref 37–98)
ALT SERPL-CCNC: 29 U/L (ref 10–49)
ANION GAP SERPL CALC-SCNC: 9 MMOL/L (ref 0–18)
AST SERPL-CCNC: 30 U/L (ref ?–34)
BASOPHILS # BLD AUTO: 0.03 X10(3) UL (ref 0–0.2)
BASOPHILS NFR BLD AUTO: 0.5 %
BILIRUB SERPL-MCNC: 0.3 MG/DL (ref 0.3–1.2)
BUN BLD-MCNC: 8 MG/DL (ref 9–23)
CALCIUM BLD-MCNC: 9.5 MG/DL (ref 8.7–10.6)
CHLORIDE SERPL-SCNC: 105 MMOL/L (ref 98–112)
CO2 SERPL-SCNC: 28 MMOL/L (ref 21–32)
CREAT BLD-MCNC: 0.78 MG/DL (ref 0.55–1.02)
DEPRECATED HBV CORE AB SER IA-ACNC: 42 NG/ML (ref 50–306)
EGFRCR SERPLBLD CKD-EPI 2021: 101 ML/MIN/1.73M2 (ref 60–?)
EOSINOPHIL # BLD AUTO: 0.07 X10(3) UL (ref 0–0.7)
EOSINOPHIL NFR BLD AUTO: 1.1 %
ERYTHROCYTE [DISTWIDTH] IN BLOOD BY AUTOMATED COUNT: 12.5 %
FASTING STATUS PATIENT QL REPORTED: NO
GLOBULIN PLAS-MCNC: 2.4 G/DL (ref 2–3.5)
GLUCOSE BLD-MCNC: 98 MG/DL (ref 70–99)
HCT VFR BLD AUTO: 38.4 % (ref 35–48)
HGB BLD-MCNC: 12.7 G/DL (ref 12–16)
IMM GRANULOCYTES # BLD AUTO: 0.01 X10(3) UL (ref 0–1)
IMM GRANULOCYTES NFR BLD: 0.2 %
IRON SATN MFR SERPL: 16 % (ref 15–50)
IRON SERPL-MCNC: 41 UG/DL (ref 50–170)
LYMPHOCYTES # BLD AUTO: 3.17 X10(3) UL (ref 1–4)
LYMPHOCYTES NFR BLD AUTO: 50.6 %
MCH RBC QN AUTO: 28.9 PG (ref 26–34)
MCHC RBC AUTO-ENTMCNC: 33.1 G/DL (ref 31–37)
MCV RBC AUTO: 87.5 FL (ref 80–100)
MONOCYTES # BLD AUTO: 0.42 X10(3) UL (ref 0.1–1)
MONOCYTES NFR BLD AUTO: 6.7 %
NEUTROPHILS # BLD AUTO: 2.56 X10 (3) UL (ref 1.5–7.7)
NEUTROPHILS # BLD AUTO: 2.56 X10(3) UL (ref 1.5–7.7)
NEUTROPHILS NFR BLD AUTO: 40.9 %
OSMOLALITY SERPL CALC.SUM OF ELEC: 292 MOSM/KG (ref 275–295)
PLATELET # BLD AUTO: 285 10(3)UL (ref 150–450)
POTASSIUM SERPL-SCNC: 3.1 MMOL/L (ref 3.5–5.1)
PROT SERPL-MCNC: 7 G/DL (ref 5.7–8.2)
RBC # BLD AUTO: 4.39 X10(6)UL (ref 3.8–5.3)
SODIUM SERPL-SCNC: 142 MMOL/L (ref 136–145)
TOTAL IRON BINDING CAPACITY: 257 UG/DL (ref 250–425)
TRANSFERRIN SERPL-MCNC: 193 MG/DL (ref 250–380)
TSI SER-ACNC: 1.62 UIU/ML (ref 0.55–4.78)
WBC # BLD AUTO: 6.3 X10(3) UL (ref 4–11)

## 2025-04-25 PROCEDURE — 80053 COMPREHEN METABOLIC PANEL: CPT

## 2025-04-25 PROCEDURE — 83540 ASSAY OF IRON: CPT

## 2025-04-25 PROCEDURE — 36415 COLL VENOUS BLD VENIPUNCTURE: CPT

## 2025-04-25 PROCEDURE — 83550 IRON BINDING TEST: CPT

## 2025-04-25 PROCEDURE — 85025 COMPLETE CBC W/AUTO DIFF WBC: CPT

## 2025-04-25 PROCEDURE — 82728 ASSAY OF FERRITIN: CPT

## 2025-04-25 PROCEDURE — 84443 ASSAY THYROID STIM HORMONE: CPT

## 2025-04-25 NOTE — PROGRESS NOTES
Bekah Agudelo is a 35 year old female.   HPI:    Pt presents with diarrhea x 1 week.   Her son first developed diarrhea and vomiting on 4/12. Then pt developed diarrhea on 4/17. She denies N/V, abd pain, blood in stool, and fever.   Symptoms improved Saturday and Sunday but then worsened again on Monday. Yesterday (Thursday) she had diarrhea every 20 minutes. Now today only having diarrhea after eating or drinking. She notes she even has diarrhea after just drinking water.   She has tried eating plain bland foods and has been alternating water and gatorade.   She is breastfeeding and notes that her milk supply has decreased.   Denies lightheadedness and dizziness.   No recent abx use.   No recent travel.     She also notes that she had low iron last year. She has been trying to take po iron but can only tolerate a half dose every other day.     Allergies:  Allergies[1]   Current Meds:  Current Medications[2]     PMH:   Past Medical History[3]      ROS:   GENERAL: Negative for fever, chills and fatigue. NAD.  HENT: Negative for congestion, sore throat, and ear pain.  RESPIRATORY: Negative for cough, chest tightness, shortness of breath and wheezing.    CV: Negative for chest pain, palpitations and leg swelling.   GI: Negative for nausea, vomiting, abdominal pain, and blood in stool. +diarrhea.   : Negative for dysuria, hematuria and difficulty urinating.   MUSCULOSKELETAL: Negative for myalgias, back pain, joint swelling, arthralgias and gait problem.   NEURO: Negative for dizziness, syncope, weakness, numbness, tingling and headaches.   PSYCH: The patient is not nervous/anxious. No depression.      PHYSICAL EXAM:   No vital signs or physical exam completed for this visit as visit was done via telehealth.       ASSESSMENT/ PLAN:   1. Diarrhea, unspecified type  Likely related to viral illness but will check labs and stool studies given initial improvement and then recurrence   Reviewed BRAT diet and continue to  try to increase water intake   Reviewed signs of dehydration and advised to the UC/ER if develops any symptoms   - Comp Metabolic Panel (14) [E]; Future  - CBC W Differential W Platelet [E]; Future  - TSH W Reflex To Free T4 [E]; Future  - Stool Culture w/Shigatoxin [E]; Future  - C. diff toxigenic PCR (OPT) [E]; Future    2. Iron deficiency  Recheck levels   May benefit from infusion if iron still too low   - Iron And Tibc [E]; Future  - Ferritin [E]; Future       Health Maintenance Due   Topic Date Due    COVID-19 Vaccine (3 - 2024-25 season) 09/01/2024    Annual Depression Screening  01/01/2025         Pt indicates understanding and agrees to the plan.     No follow-ups on file.    MONICA Field understands phone evaluation is not a substitute for face-to-face examination or emergency care. Patient advised to go to ER or call 911 for worsening symptoms or acute distress.     Please note that the following visit was completed using two-way, real-time interactive audio communication.  This has been done in good abel to provide continuity of care in the best interest of the provider-patient relationship, due to the on-going public health crisis/national emergency and because of restrictions of visitation.  There are limitations of this visit as no physical exam could be performed.  Every conscious effort was taken to allow for sufficient and adequate time.  This billing visit was spent on reviewing labs, medications, radiology tests and decision making.  Appropriate medical decision-making and tests are ordered as detailed in the plan of care above.           [1] No Known Allergies  [2]   Current Outpatient Medications   Medication Sig Dispense Refill    prenatal vitamin with DHA 27-0.8-228 MG Oral Cap Take 1 capsule by mouth daily.     [3]   Past Medical History:   Abdominal pain    Anxiety    Nausea & vomiting    Scoliosis

## 2025-04-28 ENCOUNTER — LAB ENCOUNTER (OUTPATIENT)
Dept: LAB | Age: 35
End: 2025-04-28
Attending: INTERNAL MEDICINE
Payer: COMMERCIAL

## 2025-04-28 DIAGNOSIS — E87.6 HYPOKALEMIA: ICD-10-CM

## 2025-04-28 LAB — POTASSIUM SERPL-SCNC: 4.5 MMOL/L (ref 3.5–5.1)

## 2025-04-28 PROCEDURE — 84132 ASSAY OF SERUM POTASSIUM: CPT | Performed by: INTERNAL MEDICINE

## 2025-05-01 ENCOUNTER — PATIENT MESSAGE (OUTPATIENT)
Dept: INTERNAL MEDICINE CLINIC | Facility: CLINIC | Age: 35
End: 2025-05-01

## 2025-05-02 NOTE — TELEPHONE ENCOUNTER
Olivia Mak PA-C  5/1/2025  9:07 PM CDT Back to Top      See [facility]. Recommend hematology eval for possible iron infusion.

## 2025-06-27 ENCOUNTER — TELEPHONE (OUTPATIENT)
Age: 35
End: 2025-06-27

## 2025-06-30 ENCOUNTER — TELEPHONE (OUTPATIENT)
Age: 35
End: 2025-06-30

## 2025-07-25 ENCOUNTER — APPOINTMENT (OUTPATIENT)
Facility: LOCATION | Age: 35
End: 2025-07-25
Attending: INTERNAL MEDICINE

## 2025-08-25 ENCOUNTER — OFFICE VISIT (OUTPATIENT)
Facility: LOCATION | Age: 35
End: 2025-08-25
Attending: INTERNAL MEDICINE

## 2025-08-25 VITALS
SYSTOLIC BLOOD PRESSURE: 129 MMHG | TEMPERATURE: 99 F | DIASTOLIC BLOOD PRESSURE: 86 MMHG | BODY MASS INDEX: 17.41 KG/M2 | OXYGEN SATURATION: 100 % | WEIGHT: 104.5 LBS | HEART RATE: 78 BPM | RESPIRATION RATE: 18 BRPM | HEIGHT: 65 IN

## 2025-08-25 DIAGNOSIS — E61.1 IRON DEFICIENCY: Primary | ICD-10-CM

## (undated) NOTE — LETTER
Bekah Grullon, YDARLINGJ:[id number]    CONSENT FOR PROCEDURE/SEDATION    1. I authorize the performance upon Bekah Grullon  the following: Mirena IUD removal only     2.  I authorize Dr. Nalini Paniagua MD (and whomever is designated as the doctor’s assistant) ____________________________________________    Witness: _________________________________________ Date:___________     Physician Signature: _______________________________ Date:___________

## (undated) NOTE — LETTER
Bekah Grullon, [id number]    CONSENT FOR PROCEDURE/SEDATION    1. I authorize the performance upon Bekah Grullon  the following: Mirena IUD Insertion    2.  I authorize Dr. Radha Shabazz MD (and whomever is designated as the doctor’s assistant), to Witness: _________________________________________ Date:___________     Physician Signature: _______________________________ Date:___________

## (undated) NOTE — LETTER
Bekah Grullon, PWM:[id number]/[birth year]79    CONSENT FOR PROCEDURE/SEDATION    1. I authorize the performance upon Bekah Grullon  the following: Leonides Gaucher IUD Insertion     2.  I authorize Dr. Manuel Pena MD (and whomever is designated as the doctor’s assistant), Witness: _________________________________________ Date:___________     Physician Signature: _______________________________ Date:___________